# Patient Record
Sex: FEMALE | Race: WHITE | Employment: FULL TIME | URBAN - METROPOLITAN AREA
[De-identification: names, ages, dates, MRNs, and addresses within clinical notes are randomized per-mention and may not be internally consistent; named-entity substitution may affect disease eponyms.]

---

## 2018-07-24 PROBLEM — E66.3 OVERWEIGHT: Status: ACTIVE | Noted: 2018-07-24

## 2018-07-24 PROBLEM — R10.2 PELVIC PAIN IN FEMALE: Status: ACTIVE | Noted: 2018-04-20

## 2018-07-24 PROBLEM — F17.200 CURRENT SMOKER: Status: ACTIVE | Noted: 2018-07-24

## 2018-11-13 PROBLEM — R10.2 PELVIC PAIN IN FEMALE: Status: RESOLVED | Noted: 2018-04-20 | Resolved: 2018-11-13

## 2018-11-13 PROBLEM — E66.811 OBESITY, CLASS I, BMI 30-34.9: Status: ACTIVE | Noted: 2018-11-13

## 2018-11-13 PROBLEM — F17.200 CURRENT SMOKER: Status: RESOLVED | Noted: 2018-07-24 | Resolved: 2018-11-13

## 2018-11-13 PROBLEM — E66.3 OVERWEIGHT: Status: RESOLVED | Noted: 2018-07-24 | Resolved: 2018-11-13

## 2019-07-11 PROBLEM — R52 PAIN MANAGEMENT: Status: ACTIVE | Noted: 2019-07-11

## 2020-02-18 PROBLEM — R25.1 TREMOR: Status: ACTIVE | Noted: 2020-02-18

## 2020-06-19 PROBLEM — R94.39 ABNORMAL STRESS ECHO: Status: ACTIVE | Noted: 2020-06-19

## 2020-06-25 ENCOUNTER — TELEPHONE (OUTPATIENT)
Dept: FAMILY MEDICINE CLINIC | Age: 45
End: 2020-06-25

## 2020-10-06 PROBLEM — I20.8 MICROVASCULAR ANGINA (HCC): Status: ACTIVE | Noted: 2020-10-06

## 2020-10-06 PROBLEM — K21.9 LARYNGOPHARYNGEAL REFLUX (LPR): Status: ACTIVE | Noted: 2020-07-08

## 2020-10-06 PROBLEM — M15.0 PRIMARY OSTEOARTHRITIS INVOLVING MULTIPLE JOINTS: Status: ACTIVE | Noted: 2020-10-06

## 2020-10-06 PROBLEM — R09.A2 GLOBUS SENSATION: Status: ACTIVE | Noted: 2020-07-08

## 2020-10-06 PROBLEM — I20.89 MICROVASCULAR ANGINA: Status: ACTIVE | Noted: 2020-10-06

## 2020-10-06 PROBLEM — L82.1 SEBORRHEIC KERATOSIS: Status: ACTIVE | Noted: 2020-10-06

## 2020-10-06 PROBLEM — R94.39 POSITIVE CARDIAC STRESS TEST: Status: ACTIVE | Noted: 2020-04-27

## 2021-06-15 PROBLEM — R53.82 CHRONIC FATIGUE: Status: ACTIVE | Noted: 2021-06-15

## 2021-06-15 PROBLEM — M79.10 MYALGIA: Status: ACTIVE | Noted: 2021-06-15

## 2021-06-15 PROBLEM — G25.0 ESSENTIAL TREMOR: Status: ACTIVE | Noted: 2021-06-15

## 2021-11-19 DIAGNOSIS — R06.09 DYSPNEA ON EXERTION: ICD-10-CM

## 2021-11-19 PROBLEM — I20.1 VASOSPASTIC ANGINA (HCC): Status: ACTIVE | Noted: 2020-10-06

## 2022-02-22 PROBLEM — I99.8 ISCHEMIA: Status: ACTIVE | Noted: 2022-02-22

## 2022-02-22 PROBLEM — I25.10 ENDOTHELIAL DYSFUNCTION OF CORONARY ARTERY: Status: ACTIVE | Noted: 2022-02-22

## 2022-05-04 PROBLEM — I25.89 CARDIAC MICROVASCULAR DISEASE: Status: ACTIVE | Noted: 2022-02-10

## 2022-05-04 PROBLEM — I25.85 CARDIAC MICROVASCULAR DISEASE: Status: ACTIVE | Noted: 2022-02-10

## 2022-05-04 PROBLEM — R07.9 CHEST PAIN: Status: ACTIVE | Noted: 2020-04-27

## 2023-01-31 ENCOUNTER — OFFICE VISIT (OUTPATIENT)
Dept: FAMILY MEDICINE CLINIC | Age: 48
End: 2023-01-31
Payer: COMMERCIAL

## 2023-01-31 VITALS
SYSTOLIC BLOOD PRESSURE: 110 MMHG | HEART RATE: 90 BPM | OXYGEN SATURATION: 97 % | HEIGHT: 66 IN | WEIGHT: 211 LBS | BODY MASS INDEX: 33.91 KG/M2 | DIASTOLIC BLOOD PRESSURE: 70 MMHG

## 2023-01-31 DIAGNOSIS — R05.3 CHRONIC COUGH: ICD-10-CM

## 2023-01-31 DIAGNOSIS — R59.9 SWOLLEN LYMPH NODES: ICD-10-CM

## 2023-01-31 DIAGNOSIS — Z09 ENCOUNTER FOR EXAMINATION FOLLOWING TREATMENT AT HOSPITAL: Primary | ICD-10-CM

## 2023-01-31 DIAGNOSIS — R53.82 CHRONIC FATIGUE AND MALAISE: ICD-10-CM

## 2023-01-31 DIAGNOSIS — R53.81 CHRONIC FATIGUE AND MALAISE: ICD-10-CM

## 2023-01-31 DIAGNOSIS — K21.9 GASTROESOPHAGEAL REFLUX DISEASE WITHOUT ESOPHAGITIS: ICD-10-CM

## 2023-01-31 DIAGNOSIS — R35.0 URINARY FREQUENCY: ICD-10-CM

## 2023-01-31 DIAGNOSIS — R60.9 FLUID RETENTION: ICD-10-CM

## 2023-01-31 LAB
BILIRUBIN, POC: NORMAL
BLOOD URINE, POC: NORMAL
CLARITY, POC: CLEAR
COLOR, POC: YELLOW
GLUCOSE URINE, POC: NORMAL
KETONES, POC: NORMAL
LEUKOCYTE EST, POC: NORMAL
NITRITE, POC: NORMAL
PH, POC: 5.5
PROTEIN, POC: NORMAL
SPECIFIC GRAVITY, POC: 1.02
UROBILINOGEN, POC: 0.2

## 2023-01-31 PROCEDURE — 81002 URINALYSIS NONAUTO W/O SCOPE: CPT | Performed by: NURSE PRACTITIONER

## 2023-01-31 PROCEDURE — 99215 OFFICE O/P EST HI 40 MIN: CPT | Performed by: NURSE PRACTITIONER

## 2023-01-31 RX ORDER — FAMOTIDINE 20 MG/1
20 TABLET, FILM COATED ORAL 2 TIMES DAILY
COMMUNITY
Start: 2023-01-23 | End: 2023-07-22

## 2023-01-31 RX ORDER — FUROSEMIDE 40 MG/1
40 TABLET ORAL DAILY
COMMUNITY
Start: 2023-01-24

## 2023-01-31 RX ORDER — SUCRALFATE ORAL 1 G/10ML
1 SUSPENSION ORAL EVERY 6 HOURS
COMMUNITY
Start: 2023-01-23 | End: 2023-02-22

## 2023-01-31 ASSESSMENT — ENCOUNTER SYMPTOMS
VOMITING: 0
NAUSEA: 0
TROUBLE SWALLOWING: 0
SORE THROAT: 0
PHOTOPHOBIA: 0
SINUS PAIN: 1
CHEST TIGHTNESS: 0
DIARRHEA: 0
FACIAL SWELLING: 0
BLOOD IN STOOL: 0
EYE DISCHARGE: 0
ABDOMINAL PAIN: 0
CHOKING: 0
ALLERGIC/IMMUNOLOGIC NEGATIVE: 1
CONSTIPATION: 0
WHEEZING: 0
GASTROINTESTINAL NEGATIVE: 1
EYE ITCHING: 0
RHINORRHEA: 0
SHORTNESS OF BREATH: 1
BACK PAIN: 0
SINUS PRESSURE: 1
EYE PAIN: 0
COUGH: 1
STRIDOR: 0
EYE REDNESS: 0
APNEA: 0
VOICE CHANGE: 0
COLOR CHANGE: 0

## 2023-01-31 ASSESSMENT — PATIENT HEALTH QUESTIONNAIRE - PHQ9
3. TROUBLE FALLING OR STAYING ASLEEP: 0
8. MOVING OR SPEAKING SO SLOWLY THAT OTHER PEOPLE COULD HAVE NOTICED. OR THE OPPOSITE, BEING SO FIGETY OR RESTLESS THAT YOU HAVE BEEN MOVING AROUND A LOT MORE THAN USUAL: 0
9. THOUGHTS THAT YOU WOULD BE BETTER OFF DEAD, OR OF HURTING YOURSELF: 0
10. IF YOU CHECKED OFF ANY PROBLEMS, HOW DIFFICULT HAVE THESE PROBLEMS MADE IT FOR YOU TO DO YOUR WORK, TAKE CARE OF THINGS AT HOME, OR GET ALONG WITH OTHER PEOPLE: 0
7. TROUBLE CONCENTRATING ON THINGS, SUCH AS READING THE NEWSPAPER OR WATCHING TELEVISION: 0
SUM OF ALL RESPONSES TO PHQ QUESTIONS 1-9: 0
2. FEELING DOWN, DEPRESSED OR HOPELESS: 0
SUM OF ALL RESPONSES TO PHQ QUESTIONS 1-9: 0
SUM OF ALL RESPONSES TO PHQ QUESTIONS 1-9: 0
4. FEELING TIRED OR HAVING LITTLE ENERGY: 0
6. FEELING BAD ABOUT YOURSELF - OR THAT YOU ARE A FAILURE OR HAVE LET YOURSELF OR YOUR FAMILY DOWN: 0
5. POOR APPETITE OR OVEREATING: 0
SUM OF ALL RESPONSES TO PHQ9 QUESTIONS 1 & 2: 0
SUM OF ALL RESPONSES TO PHQ QUESTIONS 1-9: 0
1. LITTLE INTEREST OR PLEASURE IN DOING THINGS: 0

## 2023-01-31 NOTE — PROGRESS NOTES
[facility] PHYSICIAN PRACTICES  [facility]  [address] [place] [address]  [place] [id number]  Dept: [phone]  Dept Fax: [phone]  Loc: [phone]    Duane Davis is a 53 y.o. female who presents today for her medical conditions/complaints as noted below. Duane Davis is c/o of Follow-up (Pt is here to follow up on cough and swelling.)        HPI:     Chief Complaint   Patient presents with    Follow-up     Pt is here to follow up on cough and swelling. HPI    Nely Alegria presents to the office today to follow up for when she went to the ER on 01/23/2023 for a chronic cough. She went to the ER as her cardiologist sent her as she was chest pain associated with the cough and shortness of breath. She sees Dr. Gage Rico with cardiology at [address] [place] [address] for cardiac microvascular disease. She was sent to the ER as she was having sinus pain and fullness, sinus drainage, and cough for about 6 weeks. Her symptoms were thought to be secondary to GERD. She was started on Pepcid 20 mg daily and Carafate by the ER. Today, Nely Alegria admits to being concerned she had an autoimmune disorder and heart failure. She is not thinking her chronic cough is from GERD. She states she is did not  her Carafate and Pepcid because she could not afford the medication. She states she feels like she is retaining fluid all over for the last 6 weeks. She has not taken her Furosemide as she states \"this is a new feeling I never had before and I was told I did not have to take my furosemide unless I needed to. \"  She admits that she getting low-grade (99.6 or 99.7) at times with body aches in the evening over the last six weeks. She states she has chronic sinus pain and pressure, but states her \"body does not like antibiotics. \" She admits that she has noticed chronic intermittent swollen glands in her right neck and left arm. She states she has had the swollen glands for over a year now.   She states she feels like she is constantly urinating and wakes up to urinate about every 2 hours. She states she constantly feels thirsty. She states that her joints hurt all over especially her right knee. She states she does not have any heartburn. She states she recently saw her cardiologist the day after going to the ER where she states that the cardiologist informed her that she may have \"heart failure\" and her echocardiogram was moved up to 2023. She states she has not seen a rheumatologist now for several years. She stopped going to her previous rheumatologist as she states that she was not getting anywhere with the rheumatologist. She admits to having chronic orthopnea.      Emergency room record, imaging and labs reviewed on 2023    Past Medical History:   Diagnosis Date    Fibromyalgia     H/O total hysterectomy 2018    unilat oop salpingectomy    History of nicotine use     quit     IBS (irritable bowel syndrome)     Insomnia     Iron malabsorption 2019    Pain management 2019    Pelvic floor dysfunction       Past Surgical History:   Procedure Laterality Date    CHOLECYSTECTOMY      COLON SURGERY      HYSTERECTOMY, TOTAL ABDOMINAL (CERVIX REMOVED)      unilat salpingectomy oop    NASAL SEPTUM SURGERY  13    OVARY SURGERY         Family History   Problem Relation Age of Onset    Heart Disease Maternal Grandmother     Arthritis Mother     Osteoporosis Mother     Osteoarthritis Mother        Social History     Tobacco Use    Smoking status: Former     Packs/day: 0.25     Years: 20.00     Pack years: 5.00     Types: Cigarettes     Quit date: 2003     Years since quittin.9    Smokeless tobacco: Never   Substance Use Topics    Alcohol use: No     Comment: 2 monthly      Current Outpatient Medications   Medication Sig Dispense Refill    famotidine (PEPCID) 20 MG tablet Take 20 mg by mouth 2 times daily      furosemide (LASIX) 40 MG tablet Take 40 mg by mouth daily      sucralfate (CARAFATE) 1 GM/10ML suspension Take 1 g by mouth in the morning and 1 g at noon and 1 g in the evening and 1 g before bedtime. amitriptyline (ELAVIL) 25 MG tablet TAKE 1 TO 2 TABLETS BY MOUTH EVERY NIGHT AT BEDTIME      atorvastatin (LIPITOR) 80 MG tablet Take 80 mg by mouth daily      DULoxetine (CYMBALTA) 60 MG extended release capsule Take 1 capsule by mouth daily 30 capsule 5    metoprolol succinate (TOPROL XL) 50 MG extended release tablet Take 50 mg by mouth daily      nitroGLYCERIN (NITRODUR) 0.4 MG/HR       ondansetron (ZOFRAN-ODT) 4 MG disintegrating tablet Take 4 mg by mouth every 12 hours as needed      amLODIPine (NORVASC) 2.5 MG tablet       ranolazine (RANEXA) 500 MG extended release tablet Take 500 mg by mouth 2 times daily      nitroGLYCERIN (NITROSTAT) 0.4 MG SL tablet PLACE 1 TABLET UNDER THE TONGUE EVERY 5 MINUTES AS NEEDED FOR CHEST PAIN      HYDROcodone-acetaminophen (NORCO)  MG per tablet Every other day      aspirin 81 MG tablet Take 81 mg by mouth daily      Prenatal Vit-Fe Fumarate-FA (PRENATAL PO) Take by mouth      cyclobenzaprine (FLEXERIL) 10 MG tablet Take 1 tablet by mouth 3 times daily as needed  2    diclofenac (VOLTAREN) 75 MG EC tablet Take 1 tablet by mouth 2 times daily as needed      Probiotic Product (PROBIOTIC DAILY PO) Take  by mouth. No current facility-administered medications for this visit. Allergies   Allergen Reactions    Kiwi Extract Anaphylaxis     Eyes, and throat swell    Pcn [Penicillins]     Doxycycline Nausea And Vomiting       :      Review of Systems   Constitutional:  Positive for chills, fatigue (Chronic) and fever (Over last 6 weeks). Negative for activity change, appetite change, diaphoresis and unexpected weight change. HENT:  Positive for congestion (inus), sinus pressure (Chronic) and sinus pain (Chronic).  Negative for dental problem, drooling, ear discharge, ear pain, facial swelling, hearing loss, mouth sores, nosebleeds, postnasal drip, rhinorrhea, sneezing, sore throat, tinnitus, trouble swallowing and voice change. Eyes:  Negative for photophobia, pain, discharge, redness, itching and visual disturbance. Respiratory:  Positive for cough (Dry, hacking) and shortness of breath (When coughing). Negative for apnea, choking, chest tightness, wheezing and stridor. Cardiovascular:  Positive for chest pain (When coughing) and leg swelling. Negative for palpitations. Gastrointestinal: Negative. Negative for abdominal pain, blood in stool, constipation, diarrhea, nausea and vomiting. Genitourinary:  Positive for frequency and urgency. Negative for decreased urine volume, difficulty urinating, dyspareunia, dysuria, enuresis, flank pain, genital sores, hematuria, menstrual problem, pelvic pain, vaginal bleeding, vaginal discharge and vaginal pain. Musculoskeletal:  Positive for arthralgias, joint swelling and myalgias. Negative for back pain, gait problem, neck pain and neck stiffness. Skin: Negative. Negative for color change, pallor, rash and wound. Allergic/Immunologic: Negative. Neurological:  Positive for weakness (Generalized fatigue and malaise). Negative for dizziness, facial asymmetry, light-headedness and headaches. Psychiatric/Behavioral:  Negative for agitation, behavioral problems, confusion, decreased concentration, dysphoric mood, hallucinations, self-injury, sleep disturbance and suicidal ideas. The patient is not nervous/anxious and is not hyperactive.         Objective:     Vitals:    01/31/23 1435   BP: 110/70   Site: Right Upper Arm   Position: Sitting   Cuff Size: Large Adult   Pulse: 90   SpO2: 97%   Weight: 211 lb (95.7 kg)   Height: 5' 6\" (1.676 m)     Wt Readings from Last 3 Encounters:   01/31/23 211 lb (95.7 kg)   05/04/22 200 lb (90.7 kg)   08/23/21 199 lb (90.3 kg)     Temp Readings from Last 3 Encounters:   01/23/21 98.4 °F (36.9 °C) (Oral)   09/23/20 98.2 °F (36.8 °C) (Temporal)   03/26/20 98.5 °F (36.9 °C) (Oral)     BP Readings from Last 3 Encounters:   01/31/23 110/70   05/04/22 118/62   08/23/21 122/86     Pulse Readings from Last 3 Encounters:   01/31/23 90   05/04/22 72   08/23/21 90     Physical Exam  Vitals and nursing note reviewed. Constitutional:       General: She is not in acute distress. Appearance: Normal appearance. She is well-developed. She is obese. She is not diaphoretic. HENT:      Head: Normocephalic and atraumatic. Right Ear: Tympanic membrane, ear canal and external ear normal. There is no impacted cerumen. Left Ear: Tympanic membrane, ear canal and external ear normal. There is no impacted cerumen. Nose: Nose normal. No congestion or rhinorrhea. Mouth/Throat:      Mouth: Mucous membranes are moist.      Pharynx: Oropharynx is clear. No oropharyngeal exudate or posterior oropharyngeal erythema. Eyes:      General: No scleral icterus. Right eye: No discharge. Left eye: No discharge. Extraocular Movements: Extraocular movements intact. Conjunctiva/sclera: Conjunctivae normal.      Pupils: Pupils are equal, round, and reactive to light. Neck:      Vascular: No carotid bruit. Trachea: No tracheal deviation. Cardiovascular:      Rate and Rhythm: Normal rate and regular rhythm. Pulses: Normal pulses. Heart sounds: Normal heart sounds. No murmur heard. No friction rub. No gallop. Pulmonary:      Effort: Pulmonary effort is normal. No respiratory distress. Breath sounds: Normal breath sounds. No stridor. No wheezing, rhonchi or rales. Chest:      Chest wall: No tenderness. Abdominal:      General: Bowel sounds are normal. There is no distension. Palpations: Abdomen is soft. There is no mass. Tenderness: There is no abdominal tenderness. There is no guarding or rebound. Hernia: No hernia is present. Musculoskeletal:         General: No swelling, tenderness, deformity or signs of injury.  Normal range of motion. Cervical back: Normal range of motion and neck supple. No rigidity. No muscular tenderness. Right lower leg: No edema. Left lower leg: No edema. Lymphadenopathy:      Cervical: No cervical adenopathy. Skin:     General: Skin is warm and dry. Capillary Refill: Capillary refill takes less than 2 seconds. Coloration: Skin is not jaundiced or pale. Findings: No bruising, erythema, lesion or rash. Neurological:      General: No focal deficit present. Mental Status: She is alert and oriented to person, place, and time. Mental status is at baseline. Cranial Nerves: No cranial nerve deficit. Sensory: No sensory deficit. Motor: No weakness or abnormal muscle tone. Coordination: Coordination normal.      Gait: Gait normal.      Deep Tendon Reflexes: Reflexes are normal and symmetric. Reflexes normal.   Psychiatric:         Mood and Affect: Mood normal.         Behavior: Behavior normal.         Thought Content:  Thought content normal.         Judgment: Judgment normal.       Admission on 01/23/2021, Discharged on 01/23/2021   Component Date Value Ref Range Status    WBC 01/23/2021 7.6  4.0 - 11.0 K/uL Final    RBC 01/23/2021 4.43  4.00 - 5.20 M/uL Final    Hemoglobin 01/23/2021 12.9  12.0 - 16.0 g/dL Final    Hematocrit 01/23/2021 38.4  36.0 - 48.0 % Final    MCV 01/23/2021 86.8  80.0 - 100.0 fL Final    MCH 01/23/2021 29.2  26.0 - 34.0 pg Final    MCHC 01/23/2021 33.6  31.0 - 36.0 g/dL Final    RDW 01/23/2021 13.9  12.4 - 15.4 % Final    Platelets 81/74/2199 402  135 - 450 K/uL Final    MPV 01/23/2021 7.6  5.0 - 10.5 fL Final    Neutrophils % 01/23/2021 57.3  % Final    Lymphocytes % 01/23/2021 31.5  % Final    Monocytes % 01/23/2021 8.5  % Final    Eosinophils % 01/23/2021 1.7  % Final    Basophils % 01/23/2021 1.0  % Final    Neutrophils Absolute 01/23/2021 4.4  1.7 - 7.7 K/uL Final    Lymphocytes Absolute 01/23/2021 2.4  1.0 - 5.1 K/uL Final Monocytes Absolute 01/23/2021 0.6  0.0 - 1.3 K/uL Final    Eosinophils Absolute 01/23/2021 0.1  0.0 - 0.6 K/uL Final    Basophils Absolute 01/23/2021 0.1  0.0 - 0.2 K/uL Final    Sodium 01/23/2021 142  136 - 145 mmol/L Final    Potassium 01/23/2021 3.8  3.5 - 5.1 mmol/L Final    Chloride 01/23/2021 107  99 - 110 mmol/L Final    CO2 01/23/2021 26  21 - 32 mmol/L Final    Anion Gap 01/23/2021 9  3 - 16 Final    Glucose 01/23/2021 110 (A)  70 - 99 mg/dL Final    BUN 01/23/2021 9  7 - 20 mg/dL Final    Creatinine 01/23/2021 0.6  0.6 - 1.1 mg/dL Final    GFR Non- 01/23/2021 >60  >60 Final    Comment: >60 mL/min/1.73m2 EGFR, calc. for ages 25 and older using the  MDRD formula (not corrected for weight), is valid for stable  renal function. GFR  01/23/2021 >60  >60 Final    Comment: Chronic Kidney Disease: less than 60 ml/min/1.73 sq.m. Kidney Failure: less than 15 ml/min/1.73 sq.m. Results valid for patients 18 years and older. Calcium 01/23/2021 9.0  8.3 - 10.6 mg/dL Final    Total Protein 01/23/2021 6.8  6.4 - 8.2 g/dL Final    Albumin 01/23/2021 4.3  3.4 - 5.0 g/dL Final    Albumin/Globulin Ratio 01/23/2021 1.7  1.1 - 2.2 Final    Total Bilirubin 01/23/2021 0.6  0.0 - 1.0 mg/dL Final    Alkaline Phosphatase 01/23/2021 76  40 - 129 U/L Final    ALT 01/23/2021 12  10 - 40 U/L Final    AST 01/23/2021 16  15 - 37 U/L Final    Globulin 01/23/2021 2.5  g/dL Final    Protime 01/23/2021 9.8 (A)  10.0 - 13.2 sec Final    Comment: Effective 11-19-19 09:00am EST  Please note reference ranges have  changed for PT and INR Testing. INR 01/23/2021 0.84 (A)  0.86 - 1.14 Final    Comment: Effective 11/19/19 at 09:00am EST    Normal: 0.86 - 1.14  Therapeutic: 2.0 - 3.0  Pros.  Valve: 2.5 - 3.5  AMI: 2.0 - 3.0      D-Dimer, Quant 01/23/2021 <200  0 - 229 ng/mL DDU Final    Comment: HemosIL D-Dimer is an automated latex enhanced immunoassay for  the quantitative determination of D-dimer in human citrated  plasma on IL Coagulation systems for use in conjunction with a  clinical pretest probability(PTP) assessment model to exclude  venous thromboembolism(VTE) in outpatients suspected of deep  venous thrombosis (DVT) and pulmonary embolism(PE). The  reference range for D-Dimer is <230 ng/ml DDU. Results <230 ng/ml  DDU can be used as a negative predictor in patients with low  and moderate probability of DVT/PE. D-Dimer levels are mainly  elevated in cases of DVT/PE.  Other conditions may lead to a  high D-Dimer level including old age, pregnancy, peripheral  arteriopathy, DIC, coronary disease, thrombolytic treatment,  cancer, liver disease, infection, inflammation, hematoma, and  rheumatoid arthritis.  Specimen interferences are created by  lipemia, bilirubin, and hemolysis.      Ventricular Rate 01/23/2021 93  BPM Final    Atrial Rate 01/23/2021 93  BPM Final    P-R Interval 01/23/2021 138  ms Final    QRS Duration 01/23/2021 90  ms Final    Q-T Interval 01/23/2021 366  ms Final    QTc Calculation (Bazett) 01/23/2021 455  ms Final    P Axis 01/23/2021 67  degrees Final    R Axis 01/23/2021 76  degrees Final    T Axis 01/23/2021 39  degrees Final    Diagnosis 01/23/2021 Sinus rhythm with occasional Premature ventricular complexesNonspecific ST and T wave abnormalityAbnormal ECGWhen compared with ECG of 26-FEB-2020 02:51,Premature ventricular complexes are now PresentNonspecific T wave abnormality now evident in Lateral leadsConfirmed by SHRUTI MARR MD ([id number]) on 1/23/2021 8:41:52 PM   Final           Assessment & Plan:     The following diagnoses and conditions are stable with no further orders unless indicated:  1. Encounter for examination following treatment at hospital    2. Chronic cough    3. Gastroesophageal reflux disease without esophagitis    4. Chronic fatigue and malaise    5. Fluid retention    6. Urinary frequency    7. Swollen lymph nodes        Jose Antonio was seen today for  follow-up. Recommend patient  the Pepcid and the Carafate for concern of GERD with her cough. She states that should be able to when she gets the money on Friday. She continues to believe that her cough is not secondary to GERD but more related to an autoimmune disorder and from possible heart failure. She did follow-up with cardiology recently and she states an echocardiogram was ordered to see about her fluid retention. She states she is been having low-grade fevers. We will recheck a CBC today. With her having increased urinary frequency, will check a urinalysis today as well. BNP ordered to evaluate her fluid retention that she feels like she is having. No edema noted on examination today. Lungs are clear. With her having generalized myalgias, fevers, fatigue, will update labs to check for possible autoimmune factor. She is not wanting to try any antibiotics for sinusitis despite her having bilateral sinus pain and pressure for several weeks now. Recommend her following up with her PCP in the next 2 weeks. Diagnoses and all orders for this visit:    Encounter for examination following treatment at hospital    Chronic cough  -     CBC with Auto Differential  -     Comprehensive Metabolic Panel    Gastroesophageal reflux disease without esophagitis    Chronic fatigue and malaise  -     Rheumatoid Factor  -     Sedimentation Rate  -     C-Reactive Protein Inflammatory  -     LENKA  -     Lyme Disease AB Total W Rflx to IgG/ IgM  -     TSH with Reflex    Fluid retention  -     Brain Natriuretic Peptide  -     Lyme Disease AB Total W Rflx to IgG/ IgM    Urinary frequency  -     POCT Urinalysis no Micro    Swollen lymph nodes  -     Lyme Disease AB Total W Rflx to IgG/ IgM    Prior to Visit Medications    Medication Sig Taking?  Authorizing Provider   famotidine (PEPCID) 20 MG tablet Take 20 mg by mouth 2 times daily Yes Historical Provider, MD   furosemide (LASIX) 40 MG tablet Take 40 mg by mouth daily Yes Historical Provider, MD   sucralfate (CARAFATE) 1 GM/10ML suspension Take 1 g by mouth in the morning and 1 g at noon and 1 g in the evening and 1 g before bedtime. Yes Historical Provider, MD   amitriptyline (ELAVIL) 25 MG tablet TAKE 1 TO 2 TABLETS BY MOUTH EVERY NIGHT AT BEDTIME Yes Historical Provider, MD   atorvastatin (LIPITOR) 80 MG tablet Take 80 mg by mouth daily Yes Historical Provider, MD   DULoxetine (CYMBALTA) 60 MG extended release capsule Take 1 capsule by mouth daily Yes Stephanie Mayo, APRN - CNP   metoprolol succinate (TOPROL XL) 50 MG extended release tablet Take 50 mg by mouth daily Yes Historical Provider, MD   nitroGLYCERIN (NITRODUR) 0.4 MG/HR  Yes Historical Provider, MD   ondansetron (ZOFRAN-ODT) 4 MG disintegrating tablet Take 4 mg by mouth every 12 hours as needed Yes Historical Provider, MD   amLODIPine (NORVASC) 2.5 MG tablet  Yes Historical Provider, MD   ranolazine (RANEXA) 500 MG extended release tablet Take 500 mg by mouth 2 times daily Yes Historical Provider, MD   nitroGLYCERIN (NITROSTAT) 0.4 MG SL tablet PLACE 1 TABLET UNDER THE TONGUE EVERY 5 MINUTES AS NEEDED FOR CHEST PAIN Yes Historical Provider, MD   HYDROcodone-acetaminophen (Jimbo Rands)  MG per tablet Every other day Yes Historical Provider, MD   aspirin 81 MG tablet Take 81 mg by mouth daily Yes Historical Provider, MD   Prenatal Vit-Fe Fumarate-FA (PRENATAL PO) Take by mouth Yes Historical Provider, MD   cyclobenzaprine (FLEXERIL) 10 MG tablet Take 1 tablet by mouth 3 times daily as needed Yes Historical Provider, MD   diclofenac (VOLTAREN) 75 MG EC tablet Take 1 tablet by mouth 2 times daily as needed Yes Historical Provider, MD   Probiotic Product (PROBIOTIC DAILY PO) Take  by mouth. Yes Historical Provider, MD     No orders of the defined types were placed in this encounter. Return in 2 weeks (on 2/14/2023), or if symptoms worsen or fail to improve, for F/U with PCP on cough .     Patient should call the office immediately with new or ongoing signs or symptoms or worsening, or proceedto the emergency room. No changes in past medical history, past surgical history, social history, or family history were noted during the patient encounter unless specifically listed above. All updates of past medicalhistory, past surgical history, social history, or family history were reviewed personally by me during the office visit. All problems listed in the assessment are stable unless noted otherwise. Medication profilereviewed personally by me during the office visit. Medication side effects and possible impairments from medications were discussed as applicable. Call if pattern of symptoms change or persists for an extended time. This document was prepared by a combination of typing and transcription through a voice recognition software. All medications have the potential for adverse effects. All medications effect each person differently. Please read and review provided information related to medication. If the medication that you have been prescribed has the potential to cause sedation, do not drive or operate car, truck, or heavy machinery until you know how the medication will effect you. If you experience any adverse effects from the medication, please call the office or report to the emergency department. A total of 52 minutes was spent reviewing previous progress notes, discussing HPI, ROS, treatment plan of care, patient education, and completion of progress note.

## 2023-02-01 DIAGNOSIS — R05.3 CHRONIC COUGH: Primary | ICD-10-CM

## 2023-02-01 DIAGNOSIS — D72.829 LEUKOCYTOSIS, UNSPECIFIED TYPE: Primary | ICD-10-CM

## 2023-02-01 LAB
A/G RATIO: 2 (ref 1.1–2.2)
ALBUMIN SERPL-MCNC: 4.5 G/DL (ref 3.4–5)
ALP BLD-CCNC: 75 U/L (ref 40–129)
ALT SERPL-CCNC: 15 U/L (ref 10–40)
ANION GAP SERPL CALCULATED.3IONS-SCNC: 18 MMOL/L (ref 3–16)
ANTI-NUCLEAR ANTIBODY (ANA): NEGATIVE
AST SERPL-CCNC: 17 U/L (ref 15–37)
BASOPHILS ABSOLUTE: 0.1 K/UL (ref 0–0.2)
BASOPHILS RELATIVE PERCENT: 1.1 %
BILIRUB SERPL-MCNC: 0.5 MG/DL (ref 0–1)
BUN BLDV-MCNC: 12 MG/DL (ref 7–20)
CALCIUM SERPL-MCNC: 9.9 MG/DL (ref 8.3–10.6)
CHLORIDE BLD-SCNC: 101 MMOL/L (ref 99–110)
CO2: 22 MMOL/L (ref 21–32)
CREAT SERPL-MCNC: 0.9 MG/DL (ref 0.6–1.1)
EOSINOPHILS ABSOLUTE: 0.1 K/UL (ref 0–0.6)
EOSINOPHILS RELATIVE PERCENT: 0.9 %
GFR SERPL CREATININE-BSD FRML MDRD: >60 ML/MIN/{1.73_M2}
GLUCOSE BLD-MCNC: 130 MG/DL (ref 70–99)
HCT VFR BLD CALC: 41.8 % (ref 36–48)
HEMOGLOBIN: 13.9 G/DL (ref 12–16)
LYMPHOCYTES ABSOLUTE: 2.5 K/UL (ref 1–5.1)
LYMPHOCYTES RELATIVE PERCENT: 17.7 %
MCH RBC QN AUTO: 29.5 PG (ref 26–34)
MCHC RBC AUTO-ENTMCNC: 33.2 G/DL (ref 31–36)
MCV RBC AUTO: 89 FL (ref 80–100)
MONOCYTES ABSOLUTE: 1.1 K/UL (ref 0–1.3)
MONOCYTES RELATIVE PERCENT: 7.8 %
NEUTROPHILS ABSOLUTE: 10.3 K/UL (ref 1.7–7.7)
NEUTROPHILS RELATIVE PERCENT: 72.5 %
PDW BLD-RTO: 13.7 % (ref 12.4–15.4)
PLATELET # BLD: 434 K/UL (ref 135–450)
PMV BLD AUTO: 8.5 FL (ref 5–10.5)
POTASSIUM SERPL-SCNC: 4.2 MMOL/L (ref 3.5–5.1)
PRO-BNP: 28 PG/ML (ref 0–124)
RBC # BLD: 4.7 M/UL (ref 4–5.2)
RHEUMATOID FACTOR: <10 IU/ML
SEDIMENTATION RATE, ERYTHROCYTE: 7 MM/HR (ref 0–20)
SODIUM BLD-SCNC: 141 MMOL/L (ref 136–145)
TOTAL PROTEIN: 6.7 G/DL (ref 6.4–8.2)
TSH REFLEX: 1.04 UIU/ML (ref 0.27–4.2)
WBC # BLD: 14.2 K/UL (ref 4–11)

## 2023-02-01 RX ORDER — AZITHROMYCIN 250 MG/1
250 TABLET, FILM COATED ORAL SEE ADMIN INSTRUCTIONS
Qty: 6 TABLET | Refills: 0 | Status: SHIPPED | OUTPATIENT
Start: 2023-02-01 | End: 2023-02-06

## 2023-02-02 LAB — LYME, EIA: 0.06 LIV (ref 0–1.2)

## 2023-02-20 ENCOUNTER — TELEMEDICINE (OUTPATIENT)
Dept: FAMILY MEDICINE CLINIC | Age: 48
End: 2023-02-20
Payer: COMMERCIAL

## 2023-02-20 DIAGNOSIS — B00.9 HERPES INFECTION: Primary | ICD-10-CM

## 2023-02-20 DIAGNOSIS — L50.8 CHRONIC URTICARIA: ICD-10-CM

## 2023-02-20 DIAGNOSIS — R59.9 SWOLLEN LYMPH NODES: ICD-10-CM

## 2023-02-20 PROCEDURE — 99213 OFFICE O/P EST LOW 20 MIN: CPT | Performed by: NURSE PRACTITIONER

## 2023-02-20 RX ORDER — VALACYCLOVIR HYDROCHLORIDE 1 G/1
1000 TABLET, FILM COATED ORAL 3 TIMES DAILY
Qty: 21 TABLET | Refills: 0 | Status: SHIPPED | OUTPATIENT
Start: 2023-02-20 | End: 2023-02-27

## 2023-02-20 RX ORDER — AMITRIPTYLINE HYDROCHLORIDE 50 MG/1
50 TABLET, FILM COATED ORAL DAILY
COMMUNITY
Start: 2023-02-09

## 2023-02-20 RX ORDER — RANOLAZINE 1000 MG/1
1000 TABLET, EXTENDED RELEASE ORAL 2 TIMES DAILY
COMMUNITY
Start: 2023-02-16

## 2023-02-20 SDOH — ECONOMIC STABILITY: FOOD INSECURITY: WITHIN THE PAST 12 MONTHS, YOU WORRIED THAT YOUR FOOD WOULD RUN OUT BEFORE YOU GOT MONEY TO BUY MORE.: NEVER TRUE

## 2023-02-20 SDOH — ECONOMIC STABILITY: INCOME INSECURITY: HOW HARD IS IT FOR YOU TO PAY FOR THE VERY BASICS LIKE FOOD, HOUSING, MEDICAL CARE, AND HEATING?: NOT HARD AT ALL

## 2023-02-20 SDOH — ECONOMIC STABILITY: FOOD INSECURITY: WITHIN THE PAST 12 MONTHS, THE FOOD YOU BOUGHT JUST DIDN'T LAST AND YOU DIDN'T HAVE MONEY TO GET MORE.: NEVER TRUE

## 2023-02-20 SDOH — ECONOMIC STABILITY: HOUSING INSECURITY
IN THE LAST 12 MONTHS, WAS THERE A TIME WHEN YOU DID NOT HAVE A STEADY PLACE TO SLEEP OR SLEPT IN A SHELTER (INCLUDING NOW)?: NO

## 2023-02-20 ASSESSMENT — ENCOUNTER SYMPTOMS
ABDOMINAL PAIN: 0
TROUBLE SWALLOWING: 0
PHOTOPHOBIA: 0
ANAL BLEEDING: 0
GASTROINTESTINAL NEGATIVE: 1
NAUSEA: 0
SHORTNESS OF BREATH: 0
EYE DISCHARGE: 1
EYE ITCHING: 1
ALLERGIC/IMMUNOLOGIC NEGATIVE: 1
EYE PAIN: 0
BLOOD IN STOOL: 0
RESPIRATORY NEGATIVE: 1
SORE THROAT: 0
EYE REDNESS: 0

## 2023-02-20 NOTE — PROGRESS NOTES
2023    TELEHEALTH EVALUATION -- Audio/Visual (During VSTLB-34 public health emergency)    HPI:    Heather Kapadia (:  1975) has requested an audio/video evaluation for the following concern(s):  Chief Complaint   Patient presents with    Other     Left eye is itchy and burning, swollen glands, rash pops up on different parts of the body and is itchy for the past 3 days. No fever      Patient states that she has a \"cold sore\" on her left eye. She states she does have a history of some sort of herpes that has been treated by ophthalmology in the past.  She does have an appointment with an ophthalmologist in 4 days but she does not want to wait to start treatment. She has used Valtrex in the past for this. She states that this time it is only her left eye. She states that she started with a rash around her left eye orbital region a few days ago and immediately followed with itching and burning. She then started yesterday with having a left eye foreign body sensation, watering of the eye and itching. Denies any visual changes. She denies any actual ocular pain. She denies discharge other than the watery discharge from the eye. She does state that her left orbital region is swollen slightly    Also the patient has a longstanding history of having a \"rash\" pop up intermittently all over her body. She also states when this \"rash\" pops up intermittently she also will have lymph nodes in her neck become very tender and swollen. She states that the symptoms will last a few hours to 24 hours and then the next day her symptoms will be completely resolved. This is been ongoing for approximately 2 to 3 years.       Last week she reports that she had 1 day where her Tongue swelled up and she started having a rash on her neck that was followed by a rash on her chest and then all over her body   She states that the glands in her neck again swelled up and were very tender to the touch for approximately 1 day and then her symptoms resolved. She states that since last week this has been happening multiple times. She has seen rheumatology because apparently at some point she was told she had some sort of autoimmune issue that was causing this rash and swollen lymph nodes. He is never seen allergy or immunology      Review of Systems   Constitutional: Negative. Negative for appetite change, chills, fatigue, fever and unexpected weight change. HENT: Negative. Negative for sore throat and trouble swallowing. Swollen and tender cervical lymph nodes   Eyes:  Positive for discharge (watery) and itching. Negative for photophobia, pain, redness and visual disturbance. Respiratory: Negative. Negative for shortness of breath. Cardiovascular: Negative. Negative for chest pain, palpitations and leg swelling. Gastrointestinal: Negative. Negative for abdominal pain, anal bleeding, blood in stool and nausea. Endocrine: Negative. Genitourinary: Negative. Negative for hematuria. Musculoskeletal:  Positive for myalgias. Skin:  Positive for rash. Allergic/Immunologic: Negative. Neurological: Negative. Negative for dizziness, syncope, light-headedness and numbness. Hematological: Negative. Does not bruise/bleed easily. Psychiatric/Behavioral: Negative. All other systems reviewed and are negative. Prior to Visit Medications    Medication Sig Taking? Authorizing Provider   famotidine (PEPCID) 20 MG tablet Take 20 mg by mouth 2 times daily Yes Historical Provider, MD   furosemide (LASIX) 40 MG tablet Take 40 mg by mouth daily Yes Historical Provider, MD   sucralfate (CARAFATE) 1 GM/10ML suspension Take 1 g by mouth in the morning and 1 g at noon and 1 g in the evening and 1 g before bedtime.  Yes Historical Provider, MD   amitriptyline (ELAVIL) 25 MG tablet TAKE 1 TO 2 TABLETS BY MOUTH EVERY NIGHT AT BEDTIME Yes Historical Provider, MD   atorvastatin (LIPITOR) 80 MG tablet Take 80 mg by mouth daily Yes Historical Provider, MD   DULoxetine (CYMBALTA) 60 MG extended release capsule Take 1 capsule by mouth daily Yes NAZARIO Arellano - CNP   metoprolol succinate (TOPROL XL) 50 MG extended release tablet Take 50 mg by mouth daily Yes Historical Provider, MD   nitroGLYCERIN (NITRODUR) 0.4 MG/HR  Yes Historical Provider, MD   ondansetron (ZOFRAN-ODT) 4 MG disintegrating tablet Take 4 mg by mouth every 12 hours as needed Yes Historical Provider, MD   amLODIPine (NORVASC) 2.5 MG tablet  Yes Historical Provider, MD   ranolazine (RANEXA) 500 MG extended release tablet Take 500 mg by mouth 2 times daily Yes Historical Provider, MD   nitroGLYCERIN (NITROSTAT) 0.4 MG SL tablet PLACE 1 TABLET UNDER THE TONGUE EVERY 5 MINUTES AS NEEDED FOR CHEST PAIN Yes Historical Provider, MD   HYDROcodone-acetaminophen (Arcadio Newhall)  MG per tablet Every other day Yes Historical Provider, MD   aspirin 81 MG tablet Take 81 mg by mouth daily Yes Historical Provider, MD   Prenatal Vit-Fe Fumarate-FA (PRENATAL PO) Take by mouth Yes Historical Provider, MD   cyclobenzaprine (FLEXERIL) 10 MG tablet Take 1 tablet by mouth 3 times daily as needed Yes Historical Provider, MD   diclofenac (VOLTAREN) 75 MG EC tablet Take 1 tablet by mouth 2 times daily as needed Yes Historical Provider, MD   Probiotic Product (PROBIOTIC DAILY PO) Take  by mouth.  Yes Historical Provider, MD       Allergies   Allergen Reactions    Kiwi Extract Anaphylaxis     Eyes, and throat swell    Pcn [Penicillins]     Doxycycline Nausea And Vomiting       PHYSICAL EXAMINATION:  [ INSTRUCTIONS:  \"[x]\" Indicates a positive item  \"[]\" Indicates a negative item  -- DELETE ALL ITEMS NOT EXAMINED]  Vital Signs: (As obtained by patient/caregiver or practitioner observation)     Blood pressure-          Heart rate-         Respiratory rate-         Temperature-            Pulse oximetry-      Constitutional: [x] Appears well-developed and well-nourished [x] No apparent distress                            [] Abnormal-   Mental status  [x] Alert and awake  [x] Oriented to person/place/time [x]Able to follow commands       Eyes:  EOM    [x]  Normal  [] Abnormal-  Sclera  [x]  Normal  [] Abnormal -         Discharge [x]  None visible  [] Abnormal -     HENT:   [x] Normocephalic, atraumatic. [] Abnormal   [x] Mouth/Throat: Mucous membranes are moist.      External Ears [x] Normal  [] Abnormal-      Neck: [x] No visualized mass      Pulmonary/Chest: [x] Respiratory effort normal.  [x] No visualized signs of difficulty breathing or respiratory distress        [] Abnormal-      Musculoskeletal:   [x] Normal gait with no signs of ataxia         [x] Normal range of motion of neck        [] Abnormal-         Neurological:        [x] No Facial Asymmetry (Cranial nerve 7 motor function) (limited exam to video visit)                       [x] No gaze palsy        [] Abnormal-         Skin:                     [x] No significant exanthematous lesions or discoloration noted on facial skin         [] Abnormal-                                  Psychiatric:           [x] Normal Affect [x] No Hallucinations        [] Abnormal     Other pertinent observable physical exam findings-      ASSESSMENT/PLAN:  1. Herpes infection  Start   - valACYclovir (VALTREX) 1 g tablet; Take 1 tablet by mouth 3 times daily for 7 days  Dispense: 21 tablet; Refill: 0  Follow-up with ophthalmology this week as scheduled    2. Swollen lymph nodes  3. Chronic urticaria  I educated the patient that the swollen lymph nodes may be related to the herpes of the eye that she is experiencing  The patient has a longstanding history of having a \"rash\" pop up intermittently all over her body. She also states when this \"rash\" pops up intermittently she also will have lymph nodes in her neck become very tender and swollen.   She states that the symptoms will last a few hours to 24 hours and then the next day her symptoms will be completely resolved. This is been ongoing for approximately 2 to 3 years. She has seen rheumatology because apparently at some point she was told she had some sort of autoimmune issue that was causing this rash and swollen lymph nodes. He is never seen allergy or immunology  Referral provided  - FRANCK Valverde MD, Allergy & Immunology, [facility]    Patient has been instructed call the office immediately with new symptoms, change in symptoms or worseningof symptoms. If this is not feasible, patient is instructed to report to the emergency room. Medication profile reviewed. Medication side effects and possible impairments from medications were discussed as applicable. Allergies were reviewed. Health maintenance was reviewed and updated as appropriate. No follow-ups on file. Devora Torres, was evaluated through a synchronous (real-time) audio-video encounter. The patient (or guardian if applicable) is aware that this is a billable service, which includes applicable co-pays. This Virtual Visit was conducted with patient's (and/or legal guardian's) consent. The visit was conducted pursuant to the emergency declaration under the [address] [place] [address],  waiver authority and the [employer] and [employer] General Act. Patient identification was verified, and a caregiver was present when appropriate. The patient was located at Home: [place] [id number]  Provider was located at [facility] (Appt Dept): [address],  [facility]        Total time spent on this encounter:  31 minutes    --NAZARIO Kincaid CNP on 2/20/2023 at 10:37 AM    An electronic signature was used to authenticate this note.

## 2023-03-01 ENCOUNTER — OFFICE VISIT (OUTPATIENT)
Dept: RHEUMATOLOGY | Age: 48
End: 2023-03-01
Payer: COMMERCIAL

## 2023-03-01 VITALS
SYSTOLIC BLOOD PRESSURE: 118 MMHG | OXYGEN SATURATION: 95 % | DIASTOLIC BLOOD PRESSURE: 80 MMHG | BODY MASS INDEX: 33.38 KG/M2 | HEART RATE: 105 BPM | WEIGHT: 206.8 LBS

## 2023-03-01 DIAGNOSIS — M35.00 SICCA SYNDROME (HCC): ICD-10-CM

## 2023-03-01 DIAGNOSIS — M15.9 PRIMARY OSTEOARTHRITIS INVOLVING MULTIPLE JOINTS: ICD-10-CM

## 2023-03-01 DIAGNOSIS — M79.7 FIBROMYALGIA: Primary | ICD-10-CM

## 2023-03-01 DIAGNOSIS — M79.7 FIBROMYALGIA: ICD-10-CM

## 2023-03-01 LAB
C-REACTIVE PROTEIN: <3 MG/L (ref 0–5.1)
SEDIMENTATION RATE, ERYTHROCYTE: 8 MM/HR (ref 0–20)
TOTAL CK: 70 U/L (ref 26–192)

## 2023-03-01 PROCEDURE — 99214 OFFICE O/P EST MOD 30 MIN: CPT | Performed by: INTERNAL MEDICINE

## 2023-03-01 NOTE — PROGRESS NOTES
3/6/2023   Patient Name: Lida Gallegos  : 1975  Medical Record: [id number]    MEDICATIONS  Current Outpatient Medications   Medication Sig Dispense Refill    amitriptyline (ELAVIL) 50 MG tablet Take 50 mg by mouth daily      ranolazine (RANEXA) 1000 MG extended release tablet Take 1,000 mg by mouth in the morning and at bedtime      famotidine (PEPCID) 20 MG tablet Take 20 mg by mouth 2 times daily      furosemide (LASIX) 40 MG tablet Take 40 mg by mouth daily      atorvastatin (LIPITOR) 80 MG tablet Take 80 mg by mouth daily      DULoxetine (CYMBALTA) 60 MG extended release capsule Take 1 capsule by mouth daily 30 capsule 5    metoprolol succinate (TOPROL XL) 50 MG extended release tablet Take 50 mg by mouth daily      nitroGLYCERIN (NITRODUR) 0.4 MG/HR       ondansetron (ZOFRAN-ODT) 4 MG disintegrating tablet Take 4 mg by mouth every 12 hours as needed      amLODIPine (NORVASC) 2.5 MG tablet       nitroGLYCERIN (NITROSTAT) 0.4 MG SL tablet PLACE 1 TABLET UNDER THE TONGUE EVERY 5 MINUTES AS NEEDED FOR CHEST PAIN      HYDROcodone-acetaminophen (NORCO)  MG per tablet Every other day      aspirin 81 MG tablet Take 81 mg by mouth daily      Prenatal Vit-Fe Fumarate-FA (PRENATAL PO) Take by mouth      cyclobenzaprine (FLEXERIL) 10 MG tablet Take 1 tablet by mouth 3 times daily as needed  2    diclofenac (VOLTAREN) 75 MG EC tablet Take 1 tablet by mouth 2 times daily as needed      Probiotic Product (PROBIOTIC DAILY PO) Take  by mouth.      sucralfate (CARAFATE) 1 GM/10ML suspension Take 1 g by mouth in the morning and 1 g at noon and 1 g in the evening and 1 g before bedtime. No current facility-administered medications for this visit. ALLERGIES  Allergies   Allergen Reactions    Kiwi Extract Anaphylaxis     Eyes, and throat swell    Pcn [Penicillins]     Doxycycline Nausea And Vomiting         Comments  No specialty comments available.     Background history: Laly FridayDEANA Certain Lowell Sanchez is a 53 y.o. female who is being seen for follow up evaluation of muscle and joint pain. She is complaining of widespread musculoskeletal pain involving upper and lower body on both sides of the midline. Symptoms started several years ago and are progressively getting worse. She has been on daily basis. Some days are better than others. Pain travels from one muscle to another. Symptoms are worse with activity/cold and rain without any significant relieving factors. She wakes up frequently at night due to pain. Sleep is not refreshing. She also has brain fog and memory changes. She also has chronic fatigue. She has anxiety. She has a chronic abdominal pain and pelvic pain. She has a history of multiple adhesions in the abdomen. She also has a history of hysterectomy with salpingo-oophorectomy due to adenomyosis. She takes Elavil 50 mg at night which helps with sleep. She also takes Flexeril as needed. She sees a pain specialist and is on hydrocodone. She is also complaining of pain in multiple joints. Symptoms are worse in her hips. She has intermittent swelling in the knees and right wrist.  She has stiffness in the morning lasting for 15 minutes. She she takes diclofenac 75 mg twice a day with improvement in pain She was recently evaluated by neurology for dizziness, tremors, loss of consciousness, headache, vision changes, low-grade fever ranging from 99.5-100. She had MRI of the brain which was normal.  She was placed on primidone for tremors. She denies psoriasis, inflammatory bowel disease, inflammatory back pain, dactylitis, enthesitis, tenosynovitis and uveitis. She has chronic dry eyes and dry mouth, sores in nose and eyes, Raynaud's and dysphagia. She denies malar rash, photosensitivity, oral ulcers, history of miscarriages, pregnancy complications, blood clots, pleurisy or pericarditis, kidney diseases, sclerodactyly, alopecia.   She denies weight loss or night sweats. Interim history: She presents for follow-up of fibromyalgia and osteo-arthritis. She was last seen in May 2021. She is no longer taking gabapentin. It did not help her much and she could not function due to cognitive impairment. She takes amitriptyline 50 mg at night as needed, diclofenac 75 mg once or twice a day as needed, Flexeril 10 mg as needed and Cymbalta 60 mg daily. She continues to have pain in the joints and muscles. She also has fatigue, memory changes, brain fog. She has difficulty falling and staying asleep. She has Raynaud's, dry eyes and dry mouth, intermittent nasal ulcers. Dry eyes and dry mouth has recently gotten worse. She was previously tested for connective tissue disease which came back negative. LENKA negative. CK, B12, vitamin D and TSH normal.  HPI  Review of Systems    REVIEW OF SYSTEMS: Positive for chronic fatigue, low-grade fevers, Raynaud's, nasal ulcers, dry eyes and dry mouth, vision changes, irritable bowel syndrome, headaches, anxiety  Constitutional: No unanticipated weight loss  Integumentary: No rash, photosensitivity, malar rash, livedo reticularis, alopecia, sclerodactyly, skin tightening  Eyes: negative for persistent redness, discharge from eyes   ENT: - No tinnitus, loss of hearing, vertigo, or recurrent ear infections.  - No history of nasal Ulcers. Cardiovascular: No history of pericarditis, chest pain or murmur or palpitations  Respiratory: No shortness of breath, cough or history of interstitial lung disease. No history of pleurisy. No history of tuberculosis or atypical infections. Gastrointestinal: No history of heart burn, esophageal dysmotility. No inflammatory bowel disease. Genitourinary: No history renal disease, miscarriages. Hematologic/Lymphatic: No abnormal bruising or bleeding, blood clots or swollen lymph nodes. Neurological: No history of headaches, seizure or focal weakness.  No history of neuropathies, paresthesias or hyperesthesias, facial droop, diplopia  Psychiatric: No history of bipolar disease  Endocrine: Denies any polyuria, polydipsia and osteoporosis  Allergic/Immunologic: No nasal congestion or hives. I have reviewed patients Past medical History, Social History and Family History as mentioned in her chart and this remains unchanged fromprevious.     Past Medical History:   Diagnosis Date    Fibromyalgia     H/O total hysterectomy 2018    unilat oop salpingectomy    History of nicotine use     quit     IBS (irritable bowel syndrome)     Insomnia     Iron malabsorption 2019    Pain management 2019    Pelvic floor dysfunction      Past Surgical History:   Procedure Laterality Date    CHOLECYSTECTOMY      COLON SURGERY      HYSTERECTOMY, TOTAL ABDOMINAL (CERVIX REMOVED)      unilat salpingectomy oop    NASAL SEPTUM SURGERY  13    OVARY SURGERY       Social History     Socioeconomic History    Marital status:      Spouse name: Not on file    Number of children: Not on file    Years of education: Not on file    Highest education level: Not on file   Occupational History    Not on file   Tobacco Use    Smoking status: Former     Packs/day: 0.25     Years: 20.00     Pack years: 5.00     Types: Cigarettes     Quit date: 2003     Years since quittin.0    Smokeless tobacco: Never   Vaping Use    Vaping Use: Every day    Substances: Nicotine    Devices: Refillable tank   Substance and Sexual Activity    Alcohol use: No     Comment: 2 monthly    Drug use: No    Sexual activity: Yes     Partners: Male   Other Topics Concern    Not on file   Social History Narrative    Not on file     Social Determinants of Health     Financial Resource Strain: Low Risk     Difficulty of Paying Living Expenses: Not hard at all   Food Insecurity: No Food Insecurity    Worried About Running Out of Food in the Last Year: Never true    Ran Out of Food in the Last Year: Never true   Transportation Needs: Unknown    Lack of Transportation (Medical): Not on file    Lack of Transportation (Non-Medical):  No   Physical Activity: Not on file   Stress: Not on file   Social Connections: Not on file   Intimate Partner Violence: Not on file   Housing Stability: Unknown    Unable to Pay for Housing in the Last Year: Not on file    Number of Places Lived in the Last Year: Not on file    Unstable Housing in the Last Year: No     Family History   Problem Relation Age of Onset    Heart Disease Maternal Grandmother     Arthritis Mother     Osteoporosis Mother     Osteoarthritis Mother        PHYSICAL EXAMINATION:    Vitals:    03/01/23 1450   BP: 118/80   Site: Right Upper Arm   Position: Sitting   Cuff Size: Large Adult   Pulse: (!) 105   SpO2: 95%   Weight: 206 lb 12.8 oz (93.8 kg)        Physical Exam  Constitutional:  Well developed, well nourished, no acute distress, non-toxic appearance   Musculoskeletal:    RIGHT  Swell  Tender  ROM  LEFT  Swell  Tender  ROM    DIP2  0  0   [place]  0  0   [place]   DIP3  0  0   [place]  0  0   [place]   DIP4  0  0  FULL   0  0  FULL    DIP5  0  0   [place]  0  0   [place]   PIP1  0  0  FULL   0  0  FULL    PIP2  0  0  FULL   0  0  FULL    PIP3  0  0  FULL   0  0  FULL    PIP4  0  0  FULL   0  0  FULL    PIP5  0  0  FULL   0  0  FULL    MCP1  0  0  FULL   0  0  FULL    MCP2  0  0  FULL   0  0  FULL    MCP3  0  0  FULL   0  0  FULL    MCP4  0  0  FULL   0  0  FULL    MCP5  0  0  FULL   0  0  FULL    Wrist  0  0  FULL   0  0  FULL    Elbow  0  0  FULL   0  0  FULL    Shouldr  0  0  FULL   0  0  FULL    Hip  0  0  DECR  0  0  DECR   Knee  0  0  FULL   0  0  FULL    Ankle  0  0  FULL   0  0  FULL    MTP1  0  0  FULL   0  0  FULL    MTP2  0  0  FULL   0  0  FULL    MTP3  0  0  FULL   0  0  FULL    MTP4  0  0  FULL   0  0  FULL    MTP5  0  0  FULL   0  0  FULL    IP1  0  0  FULL   0  0  FULL    IP2  0  0  FULL   0  0  FULL    IP3  0  0  FULL   0  0  FULL    IP4  0  0  FULL   0  0  FULL IP5  0  0  FULL   0 0 FULL                                             Right            Left                                   Tender Tender    Costochondral  + +   Low cervical + +   suboccipital + +   Trapezius  + +   Supraspinatus  + +   Lateral epicondyl + +   Gluteal + +   Greater trochanter  + +   knees + +     10 degree internal and external rotation in bilateral hips  Ambulates without assistance, normal gait  Neck: Full ROM, no tenderness,supple   Back- no tenderness. Eyes:  PERRL, extra ocular movements intact, conjunctiva normal   HEENT:  Atraumatic, normocephalic, external ears normal, oropharynx moist, no pharyngeal exudates. No lymphadenopathy or parotid gland enlargement  Respiratory:  No respiratory distress  GI:  Soft, nondistended, normal bowel sounds, nontender, noorganomegaly, no mass, no rebound, no guarding   :  No costovertebral angle tenderness   Integument:  Well hydrated, no rash or telangiectasias  Lymphatic:  No lymphadenopathy noted   Neurologic:   Alert & oriented x 3, CN 2-12 normal, no focal deficits noted. Sensations Intact. Muscle strength 5/5 proximallyand distally in upper and lower extremities.    Psychiatric:  Speech and behavior appropriate             LABS AND IMAGING  Outside data reviewed and in HPI    Lab Results   Component Value Date/Time    WBC 10.1 02/08/2023 07:53 AM    RBC 4.22 02/08/2023 07:53 AM    HGB 12.8 02/08/2023 07:53 AM    HCT 36.6 02/08/2023 07:53 AM     02/08/2023 07:53 AM    MCV 86.9 02/08/2023 07:53 AM    MCH 30.3 02/08/2023 07:53 AM    MCHC 34.8 02/08/2023 07:53 AM    RDW 14.1 02/08/2023 07:53 AM    SEGSPCT 65.0 03/19/2020 10:30 AM    BANDSPCT 2 02/08/2023 07:53 AM    LYMPHOPCT 33.0 02/08/2023 07:53 AM    MONOPCT 10.0 02/08/2023 07:53 AM    BASOPCT 0.0 02/08/2023 07:53 AM    MONOSABS 1.0 02/08/2023 07:53 AM    LYMPHSABS 3.4 02/08/2023 07:53 AM    EOSABS 0.2 02/08/2023 07:53 AM    BASOSABS 0.0 02/08/2023 07:53 AM    DIFFTYPE Auto-K 11/26/2012 01:33 PM       Chemistry        Component Value Date/Time     01/31/2023 1512    K 4.2 01/31/2023 1512    K 3.5 02/26/2020 0319     01/31/2023 1512    CO2 22 01/31/2023 1512    BUN 12 01/31/2023 1512    CREATININE 0.9 01/31/2023 1512        Component Value Date/Time    CALCIUM 9.9 01/31/2023 1512    ALKPHOS 75 01/31/2023 1512    AST 17 01/31/2023 1512    ALT 15 01/31/2023 1512    BILITOT 0.5 01/31/2023 1512          Lab Results   Component Value Date    SEDRATE 8 03/01/2023     Lab Results   Component Value Date    CRP <3.0 03/01/2023     Lab Results   Component Value Date/Time    LENKA Negative 01/31/2023 03:12 PM     Lab Results   Component Value Date/Time    RF <10.0 01/31/2023 03:12 PM     Lab Results   Component Value Date/Time    LENKA Negative 01/31/2023 03:12 PM     No results found for: DSDNAG, DSDNAIGGIFA  No results found for: SSAROAB, SSALAAB  No results found for: SMAB, RNPAB  No results found for: CENTABIGG  No results found for: C3, C4, ACE  Lab Results   Component Value Date/Time    VITD25 31.0 03/01/2023 03:25 PM     No results found for: LABURIC, URICACID  Lab Results   Component Value Date    VEAMOEIB72 524 03/01/2023     Lab Results   Component Value Date    TSHFT4 0.75 09/12/2019    TSH 2.57 01/16/2021     Lab Results   Component Value Date    VITD25 31.0 03/01/2023       ASSESSMENT AND PLAN      Assessment/Plan:      ASSESSMENT:    1. Fibromyalgia    2. Primary osteoarthritis involving multiple joints    3. Sicca syndrome (HCC)        PLAN:      1. Fibromyalgia  Fibromyalgia is a non-life threatening non-rheumatic disease. Discussed diagnosis, pathophysiology and management options with the patient.  I discussed various treatment options with the patient including the medical management and physical therapy/aquatic therapy as well as self exercises. Went over various FDA approved (cymbalta, lyrica, gabapentin, sevalla) and non-fda approved medications (muscle relaxants, ssri's) with the  patient. Written material was provided to the patient on fibromyalgia. CK, B12, TSH, vitamin D normal    continue Cymbalta 60 mg daily, amitriptyline 50 mg at night and Flexeril 10 mg at night as needed. I will also recheck vitamin B12, vitamin D and CK  I would recommend continued follow-up with PCP and/or referral to a pain specialist for further management of fibromyalgia  -Strongly emphasized on low impact aerobic and stretching exercises including biking, swimming, walking, yoga or tiachi classes  -deep breathing and relaxation exercises   -mindfulness techniques  -Counseled on Sleep hygiene   -Advised to drink 64 oz of water   -Limit caffeine intake to 8 oz/day   -     Vitamin B12; Future  -     Vitamin D 25 Hydroxy; Future  -     CK; Future    2. Primary osteoarthritis involving multiple joints  RF, CCP negative. Hand and hip x-rays without significant changes. Most likely primary osteoarthritis. Continue diclofenac 75 mg twice a day as needed. -     Hepatitis C Antibody; Future  -     Hepatitis B Surface Antigen; Future  -     Hepatitis B Core Antibody, IgM; Future    3. Sicca syndrome ([address]  She was previously tested for connective tissue disease which was negative. She now reports worsening dry eyes and dry mouth and would like to be retested. I will recheck LENKA, SSA/SSB and SPEP. I will also recommend Biotene mouthwash, frequent sips of water, sugar-free gum and artificial teardrops  -     Miscellaneous Sendout; Future  -     Electrophoresis Protein, Serum; Future  -     Anti SSA; Future  -     Anti SSB; Future  -     C-Reactive Protein; Future  -     Sedimentation Rate; Future   The patient indicates understanding of these issues and agrees with the plan. Return in about 3 months (around 6/1/2023). The risks and benefits of my recommendations, as well as other treatment options, benefits and side effects werediscussed with the patient. All questions were answered. ######################################################################    I thank you for giving me theopportunity to participate in Hortensia Ainsley care. If you have any questions or concerns please feel free to contact me. I look forward to following  Ele Flores along with you. Electronically signed by: Lili Conteh MD, MD, 3/6/2023 12:26 PM    Documentation was done using voice recognition dragon software. Every effort was made to ensure accuracy;however, inadvertent unintentional computerized transcription errors may be present.

## 2023-03-02 LAB
ANTI-SS-A IGG: <0.2 AI (ref 0–0.9)
ANTI-SS-B IGG: <0.2 AI (ref 0–0.9)
HEPATITIS B CORE IGM ANTIBODY: NORMAL
HEPATITIS B SURFACE ANTIGEN INTERPRETATION: NORMAL
HEPATITIS C ANTIBODY INTERPRETATION: NORMAL
VITAMIN B-12: 524 PG/ML (ref 211–911)
VITAMIN D 25-HYDROXY: 31 NG/ML

## 2023-03-03 LAB
ALBUMIN SERPL-MCNC: 3.7 G/DL (ref 3.1–4.9)
ALPHA-1-GLOBULIN: 0.3 G/DL (ref 0.2–0.4)
ALPHA-2-GLOBULIN: 1.1 G/DL (ref 0.4–1.1)
ANTINUCLEAR AB INTERPRETIVE COMMENT: NORMAL
ANTINUCLEAR ANTIBODY, HEP-2, IGG: NORMAL
BETA GLOBULIN: 1.2 G/DL (ref 0.9–1.6)
GAMMA GLOBULIN: 0.7 G/DL (ref 0.6–1.8)
TOTAL PROTEIN: 6.9 G/DL (ref 6.4–8.2)

## 2023-03-06 LAB — SPE/IFE INTERPRETATION: NORMAL

## 2023-03-08 ENCOUNTER — OFFICE VISIT (OUTPATIENT)
Dept: FAMILY MEDICINE CLINIC | Age: 48
End: 2023-03-08
Payer: COMMERCIAL

## 2023-03-08 VITALS
BODY MASS INDEX: 33.27 KG/M2 | HEART RATE: 84 BPM | HEIGHT: 66 IN | SYSTOLIC BLOOD PRESSURE: 122 MMHG | WEIGHT: 207 LBS | DIASTOLIC BLOOD PRESSURE: 78 MMHG | OXYGEN SATURATION: 97 %

## 2023-03-08 DIAGNOSIS — L50.8 CHRONIC URTICARIA: ICD-10-CM

## 2023-03-08 DIAGNOSIS — D72.829 LEUKOCYTOSIS, UNSPECIFIED TYPE: ICD-10-CM

## 2023-03-08 DIAGNOSIS — D64.9 ANEMIA, UNSPECIFIED TYPE: ICD-10-CM

## 2023-03-08 DIAGNOSIS — R53.83 FATIGUE, UNSPECIFIED TYPE: ICD-10-CM

## 2023-03-08 DIAGNOSIS — R33.9 URINARY RETENTION: Primary | ICD-10-CM

## 2023-03-08 LAB
BILIRUBIN, POC: NEGATIVE
BLOOD URINE, POC: NEGATIVE
CLARITY, POC: NORMAL
COLOR, POC: NORMAL
GLUCOSE URINE, POC: NEGATIVE
KETONES, POC: NEGATIVE
LEUKOCYTE EST, POC: NEGATIVE
NITRITE, POC: NEGATIVE
PH, POC: 6
PROTEIN, POC: NEGATIVE
SPECIFIC GRAVITY, POC: 1.01
UROBILINOGEN, POC: NORMAL

## 2023-03-08 PROCEDURE — 81002 URINALYSIS NONAUTO W/O SCOPE: CPT | Performed by: NURSE PRACTITIONER

## 2023-03-08 PROCEDURE — 81000 URINALYSIS NONAUTO W/SCOPE: CPT | Performed by: NURSE PRACTITIONER

## 2023-03-08 PROCEDURE — 99213 OFFICE O/P EST LOW 20 MIN: CPT | Performed by: NURSE PRACTITIONER

## 2023-03-08 NOTE — PROGRESS NOTES
[address]  [address] [facility] [address]  Dept: [phone]  Dept Fax: [phone]  Loc: [phone]    Ihsan Nunez is a 53 y.o. female who presents today for her medical conditions/complaints as noted below. Ihsan Nunez is c/o of Other (Rash that comes and goes for about a week and a half. Worse at night. Rash with pain on upper and lower extremities )       Subjective:     Chief Complaint   Patient presents with    Other     Rash that comes and goes for about a week and a half. Worse at night. Rash with pain on upper and lower extremities        HPI  Again the patient is being seen for this longstanding history of having a \"rash\" that \"pop up\" intermittently all over her body. Has been on going for several years. She also states that she will have lymph nodes in her neck they become very tender and swollen. She states that the symptoms will last for a few hours to 1 day and then they will resolve. Recently the patient did have evaluation at an allergy and asthma center. Testing was negative to any allergies or food allergies  The patient states that now she is also having spots that are pruritic that keep occurring on and off at bedtime only. The skin on her legs is sensitive to touch. The blankets hurt to touch her legs. The last visit the patient was also seen for some urinary issues  Still voiding every night but has improved. The patient states she feels she is  having urinary retention unless she bends over to force out her urine. The patient also states states that she has tremors on the inside even when she is not anxious.      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 Negative for appetite change, chills, fatigue, fever and unexpected weight change. HENT: Negative. Negative for sore throat and trouble swallowing. Swollen and tender cervical lymph nodes   Eyes:  Negative for photophobia, pain, redness and visual disturbance. Respiratory: Negative. Negative for shortness of breath. Cardiovascular: Negative. Negative for chest pain, palpitations and leg swelling. Gastrointestinal: Negative. Negative for abdominal pain, anal bleeding, blood in stool and nausea. Endocrine: Negative. Genitourinary: Negative. Negative for hematuria. Musculoskeletal:  Positive for myalgias. Skin:  Positive for rash. Allergic/Immunologic: Negative. Neurological: Negative. Negative for dizziness, syncope, light-headedness and numbness. Hematological: Negative. Does not bruise/bleed easily. Psychiatric/Behavioral: Negative. All other systems reviewed and are negative.      Current Outpatient Medications   Medication Sig Dispense Refill    amitriptyline (ELAVIL) 50 MG tablet Take 50 mg by mouth daily      ranolazine (RANEXA) 1000 MG extended release tablet Take 1,000 mg by mouth in the morning and at bedtime      famotidine (PEPCID) 20 MG tablet Take 20 mg by mouth 2 times daily      furosemide (LASIX) 40 MG tablet Take 40 mg by mouth daily      atorvastatin (LIPITOR) 80 MG tablet Take 80 mg by mouth daily      DULoxetine (CYMBALTA) 60 MG extended release capsule Take 1 capsule by mouth daily 30 capsule 5    metoprolol succinate (TOPROL XL) 50 MG extended release tablet Take 50 mg by mouth daily      nitroGLYCERIN (NITRODUR) 0.4 MG/HR       ondansetron (ZOFRAN-ODT) 4 MG disintegrating tablet Take 4 mg by mouth every 12 hours as needed      amLODIPine (NORVASC) 2.5 MG tablet       nitroGLYCERIN (NITROSTAT) 0.4 MG SL tablet PLACE 1 TABLET UNDER THE TONGUE EVERY 5 MINUTES AS NEEDED FOR CHEST PAIN      HYDROcodone-acetaminophen (NORCO)  MG per tablet Every other day      aspirin 81 MG tablet Take 81 mg by mouth daily      Prenatal Vit-Fe Fumarate-FA (PRENATAL PO) Take by mouth      cyclobenzaprine (FLEXERIL) 10 MG tablet Take 1 tablet by mouth 3 times daily as needed  2    diclofenac (VOLTAREN) 75 MG EC tablet Take 1 tablet by mouth 2 times daily as needed      Probiotic Product (PROBIOTIC DAILY PO) Take  by mouth.      sucralfate (CARAFATE) 1 GM/10ML suspension Take 1 g by mouth in the morning and 1 g at noon and 1 g in the evening and 1 g before bedtime. No current facility-administered medications for this visit. No changes in past medical history, past surgical history, social history, orfamily history were noted during the patient encounter unless specifically listed above. All updates of past medical history, past surgical history, social history, or family history were reviewed personally by me duringthe office visit. All problems listed in the assessment are stable unless noted otherwise. Medication profile reviewed personally by me during the office visit. Medication side effects and possible impairments frommedications were discussed as applicable. Objective:     Physical Exam  Vitals and nursing note reviewed. Constitutional:       General: She is not in acute distress. Appearance: Normal appearance. She is well-developed. HENT:      Head: Normocephalic and atraumatic. Right Ear: Tympanic membrane, ear canal and external ear normal.      Left Ear: Tympanic membrane, ear canal and external ear normal.      Nose: Nose normal.      Mouth/Throat:      Pharynx: Uvula midline. No oropharyngeal exudate. Eyes:      General: Lids are normal.      Conjunctiva/sclera: Conjunctivae normal.      Pupils: Pupils are equal, round, and reactive to light. Neck:      Thyroid: No thyromegaly. Vascular: No carotid bruit or JVD.    Cardiovascular: Rate and Rhythm: Normal rate and regular rhythm. Pulses: Normal pulses. Radial pulses are 2+ on the right side and 2+ on the left side. Dorsalis pedis pulses are 2+ on the right side and 2+ on the left side. Posterior tibial pulses are 2+ on the right side and 2+ on the left side. Heart sounds: Normal heart sounds. No murmur heard. No friction rub. No gallop. Pulmonary:      Effort: Pulmonary effort is normal.      Breath sounds: Normal breath sounds. Abdominal:      General: Bowel sounds are normal.      Palpations: Abdomen is soft. There is no mass. Tenderness: There is no abdominal tenderness. Musculoskeletal:         General: Normal range of motion. Cervical back: Normal range of motion and neck supple. Lymphadenopathy:      Head:      Right side of head: No submandibular adenopathy. Left side of head: No submandibular adenopathy. Cervical: No cervical adenopathy. Skin:     General: Skin is warm and dry. Findings: No lesion or rash. Neurological:      Mental Status: She is alert and oriented to person, place, and time. Gait: Gait normal.   Psychiatric:         Speech: Speech normal.         Behavior: Behavior normal.         Thought Content: Thought content normal.         Judgment: Judgment normal.       /78 (Site: Left Upper Arm, Position: Sitting, Cuff Size: Large Adult)   Pulse 84   Ht 5' 6\" (1.676 m)   Wt 207 lb (93.9 kg)   LMP  (LMP Unknown)   SpO2 97%   BMI 33.41 kg/m²   Body mass index is 33.41 kg/m².     BP Readings from Last 2 Encounters:   03/08/23 122/78   03/01/23 118/80       Wt Readings from Last 3 Encounters:   03/08/23 207 lb (93.9 kg)   03/01/23 206 lb 12.8 oz (93.8 kg)   01/31/23 211 lb (95.7 kg)       Lab Review   Orders Only on 03/01/2023   Component Date Value    Hep B Core Ab, IgM 03/01/2023 Non-reactive     Hep B S Ag Interp 03/01/2023 Non-reactive     Hep C Ab Interp 03/01/2023 Non-reactive Total CK 03/01/2023 70     Vit D, 25-Hydroxy 03/01/2023 31.0     Vitamin B-12 03/01/2023 524     Sed Rate 03/01/2023 8     CRP 03/01/2023 <3.0     Anti-SS-B IgG 03/01/2023 <0.2     Anti-SS-A IgG 03/01/2023 <0.2     Total Protein 03/01/2023 6.9     Albumin 03/01/2023 3.7     Mipnt-5-Babzyphw 03/01/2023 0.3     Alpha-2-Globulin 03/01/2023 1.1     Beta Globulin 03/01/2023 1.2     Gamma Globulin 03/01/2023 0.7     Antinuclear Antibody, He* 03/01/2023 <1:80     Antinuclear AB Interpret* 03/01/2023 See Note     SPE/KIKO Interpretation 03/01/2023 REVIEWED    Orders Only on 02/08/2023   Component Date Value    WBC 02/08/2023 10.1     RBC 02/08/2023 4.22     Hemoglobin 02/08/2023 12.8     Hematocrit 02/08/2023 36.6     MCV 02/08/2023 86.9     MCH 02/08/2023 30.3     MCHC 02/08/2023 34.8     RDW 02/08/2023 14.1     Platelets 97/03/4339 295     MPV 02/08/2023 9.3     PLATELET SLIDE REVIEW 02/08/2023 Adequate     SLIDE REVIEW 02/08/2023 see below     Neutrophils % 02/08/2023 52.0     Lymphocytes % 02/08/2023 33.0     Monocytes % 02/08/2023 10.0     Eosinophils % 02/08/2023 2.0     Basophils % 02/08/2023 0.0     Neutrophils Absolute 02/08/2023 5.5     Lymphocytes Absolute 02/08/2023 3.4     Monocytes Absolute 02/08/2023 1.0     Eosinophils Absolute 02/08/2023 0.2     Basophils Absolute 02/08/2023 0.0     Bands Relative 02/08/2023 2     Atypical Lymphocytes Rel* 02/08/2023 1    Office Visit on 01/31/2023   Component Date Value    Color, UA 01/31/2023 yellow     Clarity, UA 01/31/2023 clear     Glucose, UA POC 01/31/2023 neg     Bilirubin, UA 01/31/2023 neg     Ketones, UA 01/31/2023 neg     Spec Grav, UA 01/31/2023 1.020     Blood, UA POC 01/31/2023 neg     pH, UA 01/31/2023 5.5     Protein, UA POC 01/31/2023 neg     Urobilinogen, UA 01/31/2023 0.2     Leukocytes, UA 01/31/2023 neg     Nitrite, UA 01/31/2023 neg     WBC 01/31/2023 14.2 (A)     RBC 01/31/2023 4.70     Hemoglobin 01/31/2023 13.9     Hematocrit 01/31/2023 41.8 MCV 01/31/2023 89.0     MCH 01/31/2023 29.5     MCHC 01/31/2023 33.2     RDW 01/31/2023 13.7     Platelets 41/96/9782 434     MPV 01/31/2023 8.5     Neutrophils % 01/31/2023 72.5     Lymphocytes % 01/31/2023 17.7     Monocytes % 01/31/2023 7.8     Eosinophils % 01/31/2023 0.9     Basophils % 01/31/2023 1.1     Neutrophils Absolute 01/31/2023 10.3 (A)     Lymphocytes Absolute 01/31/2023 2.5     Monocytes Absolute 01/31/2023 1.1     Eosinophils Absolute 01/31/2023 0.1     Basophils Absolute 01/31/2023 0.1     Sodium 01/31/2023 141     Potassium 01/31/2023 4.2     Chloride 01/31/2023 101     CO2 01/31/2023 22     Anion Gap 01/31/2023 18 (A)     Glucose 01/31/2023 130 (A)     BUN 01/31/2023 12     Creatinine 01/31/2023 0.9     Est, Glom Filt Rate 01/31/2023 >60     Calcium 01/31/2023 9.9     Total Protein 01/31/2023 6.7     Albumin 01/31/2023 4.5     Albumin/Globulin Ratio 01/31/2023 2.0     Total Bilirubin 01/31/2023 0.5     Alkaline Phosphatase 01/31/2023 75     ALT 01/31/2023 15     AST 01/31/2023 17     Rheumatoid Factor 01/31/2023 <10.0     Sed Rate 01/31/2023 7     LENKA 01/31/2023 Negative     Pro-BNP 01/31/2023 28     Lyme, EIA 01/31/2023 0.06     TSH 01/31/2023 1.04        No results found for this visit on 03/08/23. Assessment:       1. Urinary retention    2. Anemia, unspecified type    3. Leukocytosis, unspecified type    4. Fatigue, unspecified type        Results for POC orders placed in visit on 03/08/23   POCT Urinalysis no Micro   Result Value Ref Range    Color, UA      Clarity, UA      Glucose, UA POC Negative     Bilirubin, UA Negative     Ketones, UA Negative     Spec Grav, UA 1.015     Blood, UA POC Negative     pH, UA 6.0     Protein, UA POC Negative     Urobilinogen, UA 0.2 E.U./dl     Leukocytes, UA Negative     Nitrite, UA Negative             Plan:       Caitlyn White was seen today for other.     Diagnoses and all orders for this visit:    Urinary retention  -     POC URINE with Microscopic  - Culture, Urine  -     POCT Urinalysis no Micro    Anemia, unspecified type  -     Ferritin  -     Iron and TIBC    Leukocytosis, unspecified type  -     CBC with Auto Differential    Fatigue, unspecified type  -     Thyroid Peroxidase Antibody    Chronic urticaria  Did recommend patient take Zyrtec over-the-counter 10 mg nightly along with Pepcid 20 mg p.o. twice daily    Reviewed and discussed the consult notes from allergist as well as her allergy testing did not reveal a cause for any of her concerns with this intermittent rash  She also has seen rheumatology for work-up for questionable autoimmune which did not reveal any issues  Patient was on some medication in a cardiac clinic trial to make her WBC go up to produce stem cells  \"It was some sort of stem cell injection\"  This was in 2021  Since that time the patient states that her joints have been sore when she claims that she had never had joint pain for (other than her hips) or a rash issue  If labs are normal then I will refer patient to dermatology    Patient has been instructed call the office immediately with new symptoms, change in symptoms or worseningof symptoms. If this is not feasible, patient is instructed to report to the emergency room. Medication profile reviewed. Medication side effects and possible impairments from medications were discussed as applicable. Allergies were reviewed. Health maintenance was reviewed and updated as appropriate. Return if symptoms worsen or fail to improve. (Comment: Please note this report has been produced using a combination of typing and speech recognition software and may contain errors related to that system including errors in grammar, punctuation, and spelling, as well as words and phrases that may be inappropriate.  If there are any questions or concerns please feel free to contact the dictating provider for clarification.)

## 2023-03-09 LAB
BASOPHILS ABSOLUTE: 0.1 K/UL (ref 0–0.2)
BASOPHILS RELATIVE PERCENT: 1.2 %
EOSINOPHILS ABSOLUTE: 0.1 K/UL (ref 0–0.6)
EOSINOPHILS RELATIVE PERCENT: 1.3 %
FERRITIN: 34.2 NG/ML (ref 15–150)
HCT VFR BLD CALC: 39 % (ref 36–48)
HEMOGLOBIN: 13.3 G/DL (ref 12–16)
IRON SATURATION: 41 % (ref 15–50)
IRON: 120 UG/DL (ref 37–145)
LYMPHOCYTES ABSOLUTE: 2.8 K/UL (ref 1–5.1)
LYMPHOCYTES RELATIVE PERCENT: 31 %
MCH RBC QN AUTO: 30.5 PG (ref 26–34)
MCHC RBC AUTO-ENTMCNC: 34.2 G/DL (ref 31–36)
MCV RBC AUTO: 89.3 FL (ref 80–100)
MONOCYTES ABSOLUTE: 0.8 K/UL (ref 0–1.3)
MONOCYTES RELATIVE PERCENT: 9.2 %
NEUTROPHILS ABSOLUTE: 5.1 K/UL (ref 1.7–7.7)
NEUTROPHILS RELATIVE PERCENT: 57.3 %
PDW BLD-RTO: 14.3 % (ref 12.4–15.4)
PLATELET # BLD: 421 K/UL (ref 135–450)
PMV BLD AUTO: 8.7 FL (ref 5–10.5)
RBC # BLD: 4.37 M/UL (ref 4–5.2)
THYROID PEROXIDASE (TPO) ABS: 6 IU/ML
TOTAL IRON BINDING CAPACITY: 290 UG/DL (ref 260–445)
URINE CULTURE, ROUTINE: NORMAL
WBC # BLD: 9 K/UL (ref 4–11)

## 2023-03-13 ASSESSMENT — ENCOUNTER SYMPTOMS
ABDOMINAL PAIN: 0
BLOOD IN STOOL: 0
EYE REDNESS: 0
TROUBLE SWALLOWING: 0
GASTROINTESTINAL NEGATIVE: 1
ANAL BLEEDING: 0
RESPIRATORY NEGATIVE: 1
EYE PAIN: 0
ALLERGIC/IMMUNOLOGIC NEGATIVE: 1
NAUSEA: 0
SORE THROAT: 0
SHORTNESS OF BREATH: 0
PHOTOPHOBIA: 0

## 2023-03-15 RX ORDER — CETIRIZINE HYDROCHLORIDE 10 MG/1
10 TABLET ORAL DAILY
Qty: 30 TABLET | Refills: 2 | Status: SHIPPED | OUTPATIENT
Start: 2023-03-15 | End: 2023-04-14

## 2023-03-15 RX ORDER — FAMOTIDINE 20 MG/1
20 TABLET, FILM COATED ORAL 2 TIMES DAILY
Qty: 60 TABLET | Refills: 2 | Status: SHIPPED | OUTPATIENT
Start: 2023-03-15 | End: 2023-09-11

## 2023-03-20 ENCOUNTER — TELEPHONE (OUTPATIENT)
Dept: ADMINISTRATIVE | Age: 48
End: 2023-03-20

## 2023-04-08 LAB
CHOLESTEROL, TOTAL: 124 MG/DL
CHOLESTEROL/HDL RATIO: ABNORMAL
HDLC SERPL-MCNC: 37 MG/DL (ref 35–70)
LDL CHOLESTEROL CALCULATED: 52 MG/DL (ref 0–160)
NONHDLC SERPL-MCNC: 87 MG/DL
TRIGL SERPL-MCNC: 174 MG/DL
VLDLC SERPL CALC-MCNC: ABNORMAL MG/DL

## 2023-04-18 PROBLEM — R79.89 INCREASED AMMONIA LEVEL: Status: ACTIVE | Noted: 2023-04-18

## 2023-04-19 ENCOUNTER — OFFICE VISIT (OUTPATIENT)
Dept: FAMILY MEDICINE CLINIC | Age: 48
End: 2023-04-19

## 2023-04-19 ENCOUNTER — HOSPITAL ENCOUNTER (OUTPATIENT)
Age: 48
Discharge: HOME | End: 2023-04-19
Payer: COMMERCIAL

## 2023-04-19 VITALS
HEART RATE: 104 BPM | HEIGHT: 66 IN | WEIGHT: 207 LBS | BODY MASS INDEX: 33.27 KG/M2 | SYSTOLIC BLOOD PRESSURE: 130 MMHG | OXYGEN SATURATION: 99 % | DIASTOLIC BLOOD PRESSURE: 78 MMHG

## 2023-04-19 DIAGNOSIS — R74.8 ELEVATED PANCREATIC ENZYME: ICD-10-CM

## 2023-04-19 DIAGNOSIS — R20.0 RIGHT FACIAL NUMBNESS: ICD-10-CM

## 2023-04-19 DIAGNOSIS — R79.89 INCREASED AMMONIA LEVEL: Primary | ICD-10-CM

## 2023-04-19 DIAGNOSIS — R79.89 INCREASED AMMONIA LEVEL: ICD-10-CM

## 2023-04-19 DIAGNOSIS — R41.3 MEMORY PROBLEM: ICD-10-CM

## 2023-04-19 DIAGNOSIS — R47.01 APHASIA: ICD-10-CM

## 2023-04-19 DIAGNOSIS — Z09 HOSPITAL DISCHARGE FOLLOW-UP: ICD-10-CM

## 2023-04-19 LAB
AMMONIA PLAS-SCNC: 25 UMOL/L (ref 11–51)
LIPASE SERPL-CCNC: 33 U/L (ref 13–60)

## 2023-04-19 PROCEDURE — 36415 COLL VENOUS BLD VENIPUNCTURE: CPT

## 2023-04-19 PROCEDURE — 82140 ASSAY OF AMMONIA: CPT

## 2023-04-19 PROCEDURE — 83690 ASSAY OF LIPASE: CPT

## 2023-04-19 RX ORDER — LACTULOSE 10 G/15ML
SOLUTION ORAL
COMMUNITY
Start: 2023-04-18

## 2023-04-19 RX ORDER — GABAPENTIN 100 MG/1
100 CAPSULE ORAL
COMMUNITY
Start: 2023-04-09

## 2023-04-19 SDOH — ECONOMIC STABILITY: TRANSPORTATION INSECURITY
IN THE PAST 12 MONTHS, HAS LACK OF TRANSPORTATION KEPT YOU FROM MEETINGS, WORK, OR FROM GETTING THINGS NEEDED FOR DAILY LIVING?: NO

## 2023-04-19 SDOH — ECONOMIC STABILITY: INCOME INSECURITY: HOW HARD IS IT FOR YOU TO PAY FOR THE VERY BASICS LIKE FOOD, HOUSING, MEDICAL CARE, AND HEATING?: NOT VERY HARD

## 2023-04-19 SDOH — ECONOMIC STABILITY: FOOD INSECURITY: WITHIN THE PAST 12 MONTHS, THE FOOD YOU BOUGHT JUST DIDN'T LAST AND YOU DIDN'T HAVE MONEY TO GET MORE.: NEVER TRUE

## 2023-04-19 SDOH — ECONOMIC STABILITY: FOOD INSECURITY: WITHIN THE PAST 12 MONTHS, YOU WORRIED THAT YOUR FOOD WOULD RUN OUT BEFORE YOU GOT MONEY TO BUY MORE.: NEVER TRUE

## 2023-04-25 NOTE — PROGRESS NOTES
Post-Discharge Transitional Care Management Progress Note      Yvette Toro   YOB: 1975    Date of Office Visit:  4/19/2023  Date of Hospital Admission: 04/07/2023  Date of Hospital Discharge: 04/09/2023    Care management risk score Rising risk (score 2-5) and Complex Care (Scores >=6): No Risk Score On File     Non face to face  following discharge, date last encounter closed (first attempt may have been earlier): *No documented post hospital discharge outreach found in the last 14 days *No documented post hospital discharge outreach found in the last 14 days    Call initiated 2 business days of discharge: *No response recorded in the last 14 days    ASSESSMENT/PLAN:   Increased ammonia level  -     Ammonia; Future  Elevated pancreatic enzyme  -     Lipase; Future  Hospital discharge follow-up  -     OK DISCHARGE MEDS RECONCILED W/ CURRENT OUTPATIENT MED LIST  . Low suspicion for seizure given EEG wnl. MRI/MRA also wnl  Patient states she has been having chronic difficulty with staying on task and losing track of things. Seems to be functioning well; however does state she is concerned her work place is trying to forcer her into [employer]. She states because she has missed so much work they are stating she is going to lose her job unless she files for medical disability. Patient stated her work will be sending over disability paperwork. Educated patient that paperwork should be completed by the specialists that are managing her conditions to determine if disability is appropriate  F/u with GI for CT abd and EGD  F/u with outpatient neurolgy  5/2/2023 will have inpatient EEG     Medical Decision Making: moderate complexity  Return in 3 months (on 7/19/2023). Subjective:   HPI:  Follow up of Hospital problems/diagnosis(es):   1. Increased ammonia level    2.  Elevated pancreatic enzyme              Inpatient course: Discharge summary reviewed-   Per  records as follows:  Rohan Ruiz

## 2023-04-28 ENCOUNTER — TELEPHONE (OUTPATIENT)
Dept: FAMILY MEDICINE CLINIC | Age: 48
End: 2023-04-28

## 2023-04-28 RX ORDER — MELATONIN 10 MG
TABLET, SUBLINGUAL SUBLINGUAL
COMMUNITY

## 2023-04-28 RX ORDER — NALOXONE HYDROCHLORIDE 4 MG/.1ML
1 SPRAY NASAL PRN
COMMUNITY
Start: 2023-04-09

## 2023-04-28 RX ORDER — CETIRIZINE HYDROCHLORIDE 10 MG/1
10 TABLET ORAL DAILY
COMMUNITY

## 2024-02-09 ENCOUNTER — TELEMEDICINE (OUTPATIENT)
Dept: FAMILY MEDICINE CLINIC | Age: 49
End: 2024-02-09
Payer: COMMERCIAL

## 2024-02-09 DIAGNOSIS — R53.83 FATIGUE, UNSPECIFIED TYPE: ICD-10-CM

## 2024-02-09 DIAGNOSIS — R79.89 INCREASED AMMONIA LEVEL: Primary | ICD-10-CM

## 2024-02-09 DIAGNOSIS — L50.8 CHRONIC URTICARIA: ICD-10-CM

## 2024-02-09 DIAGNOSIS — R79.89 INCREASED AMMONIA LEVEL: ICD-10-CM

## 2024-02-09 DIAGNOSIS — R41.3 MEMORY PROBLEM: ICD-10-CM

## 2024-02-09 DIAGNOSIS — R20.2 PARESTHESIAS: ICD-10-CM

## 2024-02-09 LAB
ALBUMIN SERPL-MCNC: 4.9 G/DL (ref 3.4–5)
ALBUMIN/GLOB SERPL: 2.3 {RATIO} (ref 1.1–2.2)
ALP SERPL-CCNC: 68 U/L (ref 40–129)
ALT SERPL-CCNC: 16 U/L (ref 10–40)
AMMONIA PLAS-SCNC: 25 UMOL/L (ref 11–51)
ANION GAP SERPL CALCULATED.3IONS-SCNC: 12 MMOL/L (ref 3–16)
AST SERPL-CCNC: 20 U/L (ref 15–37)
BASOPHILS # BLD: 0.1 K/UL (ref 0–0.2)
BASOPHILS NFR BLD: 1.1 %
BILIRUB SERPL-MCNC: 1.6 MG/DL (ref 0–1)
BUN SERPL-MCNC: 12 MG/DL (ref 7–20)
CALCIUM SERPL-MCNC: 9.9 MG/DL (ref 8.3–10.6)
CHLORIDE SERPL-SCNC: 103 MMOL/L (ref 99–110)
CO2 SERPL-SCNC: 27 MMOL/L (ref 21–32)
CREAT SERPL-MCNC: 0.8 MG/DL (ref 0.6–1.1)
DEPRECATED RDW RBC AUTO: 13.3 % (ref 12.4–15.4)
EOSINOPHIL # BLD: 0.1 K/UL (ref 0–0.6)
EOSINOPHIL NFR BLD: 1.6 %
GFR SERPLBLD CREATININE-BSD FMLA CKD-EPI: >60 ML/MIN/{1.73_M2}
GLUCOSE SERPL-MCNC: 66 MG/DL (ref 70–99)
HCT VFR BLD AUTO: 42 % (ref 36–48)
HGB BLD-MCNC: 14.4 G/DL (ref 12–16)
LYMPHOCYTES # BLD: 2.5 K/UL (ref 1–5.1)
LYMPHOCYTES NFR BLD: 30.6 %
MCH RBC QN AUTO: 29.4 PG (ref 26–34)
MCHC RBC AUTO-ENTMCNC: 34.3 G/DL (ref 31–36)
MCV RBC AUTO: 85.5 FL (ref 80–100)
MONOCYTES # BLD: 0.7 K/UL (ref 0–1.3)
MONOCYTES NFR BLD: 9 %
NEUTROPHILS # BLD: 4.7 K/UL (ref 1.7–7.7)
NEUTROPHILS NFR BLD: 57.7 %
PLATELET # BLD AUTO: 358 K/UL (ref 135–450)
PMV BLD AUTO: 8.9 FL (ref 5–10.5)
POTASSIUM SERPL-SCNC: 4.2 MMOL/L (ref 3.5–5.1)
PROT SERPL-MCNC: 7 G/DL (ref 6.4–8.2)
RBC # BLD AUTO: 4.91 M/UL (ref 4–5.2)
SODIUM SERPL-SCNC: 142 MMOL/L (ref 136–145)
TSH SERPL DL<=0.005 MIU/L-ACNC: 0.85 UIU/ML (ref 0.27–4.2)
WBC # BLD AUTO: 8.1 K/UL (ref 4–11)

## 2024-02-09 PROCEDURE — 99213 OFFICE O/P EST LOW 20 MIN: CPT | Performed by: NURSE PRACTITIONER

## 2024-02-09 RX ORDER — GABAPENTIN 400 MG/1
400 CAPSULE ORAL 2 TIMES DAILY PRN
COMMUNITY

## 2024-02-09 ASSESSMENT — ENCOUNTER SYMPTOMS
GASTROINTESTINAL NEGATIVE: 1
ANAL BLEEDING: 0
ABDOMINAL PAIN: 0
RESPIRATORY NEGATIVE: 1
BLOOD IN STOOL: 0
EYES NEGATIVE: 1
ALLERGIC/IMMUNOLOGIC NEGATIVE: 1
NAUSEA: 0
SHORTNESS OF BREATH: 0

## 2024-02-09 NOTE — PROGRESS NOTES
atraumatic.  [] Abnormal   [x] Mouth/Throat: Mucous membranes are moist.      External Ears [x] Normal  [] Abnormal-      Neck: [x] No visualized mass      Pulmonary/Chest: [x] Respiratory effort normal.  [x] No visualized signs of difficulty breathing or respiratory distress        [] Abnormal-      Musculoskeletal:   [x] Normal gait with no signs of ataxia         [x] Normal range of motion of neck        [] Abnormal-         Neurological:        [x] No Facial Asymmetry (Cranial nerve 7 motor function) (limited exam to video visit)                       [x] No gaze palsy        [] Abnormal-         Skin:                     [x] No significant exanthematous lesions or discoloration noted on facial skin         [] Abnormal-                                  Psychiatric:           [x] Normal Affect [x] No Hallucinations        [] Abnormal     Other pertinent observable physical exam findings-      ASSESSMENT/PLAN:  1. Increased ammonia level  - Ammonia; Future  - Comprehensive Metabolic Panel; Future  - CBC with Auto Differential; Future  - TSH with Reflex; Future    2. Paresthesias      3. Memory problem      4. Fatigue, unspecified type      5. Chronic urticaria    If ammonia level comes back elevated then I do recommend she follow-up with her GI for further evaluation as to the cause.  If the ammonia level returns normal I recommend that she follow-up with neurology since this is who has been handling the majority of her symptoms    Patient has been instructed call the office immediately with new symptoms, change in symptoms or worseningof symptoms. If this is not feasible, patient is instructed to report to the emergency room. Medication profile reviewed. Medication side effects and possible impairments from medications were discussed as applicable.Allergies were reviewed. Health maintenance was reviewed and updated as appropriate.       Jsoe Antonio Johnston, was evaluated through a synchronous (real-time)

## 2024-03-04 ENCOUNTER — PATIENT MESSAGE (OUTPATIENT)
Dept: FAMILY MEDICINE CLINIC | Age: 49
End: 2024-03-04

## 2024-03-04 RX ORDER — BUSPIRONE HYDROCHLORIDE 10 MG/1
10 TABLET ORAL 3 TIMES DAILY
Qty: 90 TABLET | Refills: 0 | Status: SHIPPED | OUTPATIENT
Start: 2024-03-04 | End: 2024-04-03

## 2024-03-04 NOTE — TELEPHONE ENCOUNTER
From: Jose Antonio Johnston  To: Aye Pereira Gracie  Sent: 3/4/2024 8:52 AM EST  Subject: Sypmtoms    I have an ultrasound of my liver scheduled for 3/6. He is checking my liver but mentioned UCD’s and biopsies. There are genetic tests that can be ordered to check for them and you can order them, I do not need a specialist to do it. I am loosing my mind. They scheduled the test back in February but Heide had the baby the same day so I missed it. I am either getting beyond angry and OMG blowing up which I have never done or I am just crying. I been waking up and sad I have to go through another day because I am so physically and emotionally exhausted. I am seeing hardly any peace right now and understand test need done but would prefer they not be spread out. I honestly don’t know how much longer I can do this. I am literally on the verge of a nervous breakdown. Is it possible for you to get those test going so we can answers sooner.

## 2024-03-11 RX ORDER — CETIRIZINE HYDROCHLORIDE 10 MG/1
10 TABLET ORAL DAILY
Qty: 90 TABLET | Refills: 1 | Status: SHIPPED | OUTPATIENT
Start: 2024-03-11

## 2024-03-11 NOTE — TELEPHONE ENCOUNTER
Refill Request     CONFIRM preferrred pharmacy with the patient.    If Mail Order Rx - Pend for 90 day refill.      Last Seen: Last Seen Department: 2/9/2024  Last Seen by PCP: 2/9/2024    Last Written: 3/15/2023    If no future appointment scheduled, route STAFF MESSAGE with patient name to the  Pool for scheduling.      Next Appointment:   No future appointments.    Message sent to  to schedule appt with patient?  NO      Requested Prescriptions     Pending Prescriptions Disp Refills    cetirizine (ZYRTEC) 10 MG tablet [Pharmacy Med Name: CETIRIZINE 10MG TABLETS] 90 tablet      Sig: TAKE 1 TABLET BY MOUTH DAILY

## 2024-07-18 ENCOUNTER — OFFICE VISIT (OUTPATIENT)
Dept: FAMILY MEDICINE CLINIC | Age: 49
End: 2024-07-18
Payer: COMMERCIAL

## 2024-07-18 VITALS
HEART RATE: 88 BPM | WEIGHT: 161 LBS | BODY MASS INDEX: 25.88 KG/M2 | SYSTOLIC BLOOD PRESSURE: 120 MMHG | DIASTOLIC BLOOD PRESSURE: 80 MMHG | OXYGEN SATURATION: 98 % | HEIGHT: 66 IN

## 2024-07-18 DIAGNOSIS — R79.89 INCREASED AMMONIA LEVEL: Primary | ICD-10-CM

## 2024-07-18 DIAGNOSIS — Z13.31 POSITIVE DEPRESSION SCREENING: ICD-10-CM

## 2024-07-18 DIAGNOSIS — E16.2 HYPOGLYCEMIA: ICD-10-CM

## 2024-07-18 PROCEDURE — 99215 OFFICE O/P EST HI 40 MIN: CPT | Performed by: NURSE PRACTITIONER

## 2024-07-18 RX ORDER — POLYETHYLENE GLYCOL 3350 17 G/17G
17 POWDER, FOR SOLUTION ORAL DAILY PRN
COMMUNITY

## 2024-07-18 RX ORDER — AMLODIPINE BESYLATE 2.5 MG/1
2.5 TABLET ORAL DAILY
COMMUNITY

## 2024-07-18 ASSESSMENT — PATIENT HEALTH QUESTIONNAIRE - PHQ9
6. FEELING BAD ABOUT YOURSELF - OR THAT YOU ARE A FAILURE OR HAVE LET YOURSELF OR YOUR FAMILY DOWN: SEVERAL DAYS
5. POOR APPETITE OR OVEREATING: NEARLY EVERY DAY
4. FEELING TIRED OR HAVING LITTLE ENERGY: SEVERAL DAYS
2. FEELING DOWN, DEPRESSED OR HOPELESS: SEVERAL DAYS
SUM OF ALL RESPONSES TO PHQ QUESTIONS 1-9: 11
9. THOUGHTS THAT YOU WOULD BE BETTER OFF DEAD, OR OF HURTING YOURSELF: NOT AT ALL
7. TROUBLE CONCENTRATING ON THINGS, SUCH AS READING THE NEWSPAPER OR WATCHING TELEVISION: SEVERAL DAYS
3. TROUBLE FALLING OR STAYING ASLEEP: MORE THAN HALF THE DAYS
8. MOVING OR SPEAKING SO SLOWLY THAT OTHER PEOPLE COULD HAVE NOTICED. OR THE OPPOSITE - BEING SO FIDGETY OR RESTLESS THAT YOU HAVE BEEN MOVING AROUND A LOT MORE THAN USUAL: SEVERAL DAYS
1. LITTLE INTEREST OR PLEASURE IN DOING THINGS: SEVERAL DAYS
10. IF YOU CHECKED OFF ANY PROBLEMS, HOW DIFFICULT HAVE THESE PROBLEMS MADE IT FOR YOU TO DO YOUR WORK, TAKE CARE OF THINGS AT HOME, OR GET ALONG WITH OTHER PEOPLE: EXTREMELY DIFFICULT

## 2024-07-18 NOTE — PROGRESS NOTES
03/26/2024 165     GLYCINE 03/26/2024 517 (H)     ALANINE 03/26/2024 427     CITRULLINE 03/26/2024 30     VALINE 03/26/2024 178     METHIONINE 03/26/2024 20     CYSTINE 03/26/2024 38     ISOLEUCINE 03/26/2024 54     LEUCINE 03/26/2024 98     TYROSINE 03/26/2024 49     PHENYLALNINE 03/26/2024 61     HOMOCYSTINE 03/26/2024 <4     ORNITHINE 03/26/2024 121 (H)     LYSINE 03/26/2024 172     HISTIDINE 03/26/2024 83     ARGININE 03/26/2024 96     Interpretation 03/26/2024                      Value:Indication: intermittent hyperammonemia.  In this sample, the glutamine, alanine, citrulline, and arginine are all normal.  No argininosuccinic acid present.  Ornithine is above the reference range but not in the range for HHH syndrome ( where the expectation would be to find ornithine around 500 uM).  No diagnostic amino acid pattern.      Carnitine, Total 03/26/2024 61.7     Carnitine, Free 03/26/2024 45.6     CARNITINE, SHRT CHAIN 03/26/2024 16.1     Organic Acid Interpretat* 03/26/2024 Indication:  intermittent hyperammonemia.  No increase in lactate, ketones, or methylmalonic.  No orotic detected.  Ortho- hippurate is noted- this is an aspirin metabolite.  No diagnostic organic acid pattern.     Disclaimer 03/26/2024                      Value:This test was developed and its performance characteristics were determined and validated by the Clinical [facility] Spectrom[facility] Laboratory at [facility].  It has not been cleared or approved by the US Food and Drug Administration.  This laboratory is certified under the Clinical Laboratory Improvement Amendments of 1988 (CLIA 88) as qualified to perform high-complexity laboratory testing.        Document 03/26/2024                            No results found for this visit on 07/18/24.       Assessment:       1. Increased ammonia level    2. Hypoglycemia    3. Positive depression screening        No results found for this visit on 07/18/24.         Plan:

## 2024-07-19 ENCOUNTER — TELEPHONE (OUTPATIENT)
Dept: ENDOCRINOLOGY | Age: 49
End: 2024-07-19

## 2024-07-22 ENCOUNTER — PATIENT MESSAGE (OUTPATIENT)
Dept: FAMILY MEDICINE CLINIC | Age: 49
End: 2024-07-22

## 2024-07-22 DIAGNOSIS — E72.20 HYPERAMMONEMIA (HCC): Primary | ICD-10-CM

## 2024-07-22 NOTE — TELEPHONE ENCOUNTER
Referral placed and call the number and got the fax number, referral faxed. And patient called and informed of referral faxed and informed that if she doesn't hear from the [facility] Queen office in 3-5 days to call his office to schedule

## 2024-07-22 NOTE — TELEPHONE ENCOUNTER
From: Jose Antonio Johnston  To: Aye Bowman  Sent: 7/22/2024 12:20 PM EDT  Subject: Gastro referral    I have done the research we discussed and found that, I would like to try to schedule with Dr Sergei Queen at [facility] and there number is listed as [phone]. Please let me know if they will call me or I have to call them. That you so much for all your help and cares.

## 2024-07-24 ASSESSMENT — ENCOUNTER SYMPTOMS
ANAL BLEEDING: 0
NAUSEA: 0
ALLERGIC/IMMUNOLOGIC NEGATIVE: 1
EYES NEGATIVE: 1
GASTROINTESTINAL NEGATIVE: 1
ABDOMINAL PAIN: 0
SHORTNESS OF BREATH: 0
RESPIRATORY NEGATIVE: 1
BLOOD IN STOOL: 0

## 2024-08-12 ENCOUNTER — TELEPHONE (OUTPATIENT)
Dept: FAMILY MEDICINE CLINIC | Age: 49
End: 2024-08-12

## 2024-08-12 DIAGNOSIS — R79.89 INCREASED AMMONIA LEVEL: Primary | ICD-10-CM

## 2024-08-12 NOTE — TELEPHONE ENCOUNTER
Patient states she was referred to [facility] liver specialist and reports that they cannot get her in until October of next year. States that she found a provider in [place] that she would like to see but will need a referral and all pertinent notes/labs/imaging faxed to:       Dr. Boris Milton III, Hepatologist  [address], [place] [address]     Phone [phone]    Fax [phone]

## 2024-08-14 ENCOUNTER — TELEPHONE (OUTPATIENT)
Dept: FAMILY MEDICINE CLINIC | Age: 49
End: 2024-08-14

## 2024-08-14 NOTE — TELEPHONE ENCOUNTER
The patient just saw neurology on 7/26/2024 for this issue.  Patient should have reasonable accommodations for her work filled out by neurology

## 2024-08-14 NOTE — TELEPHONE ENCOUNTER
Patient asking for a  completion of reasonable accommodations to be able to work from home on her bad days where she can not drive due to her confusion until they figure out what is going on with her liver.

## 2024-10-31 ENCOUNTER — TELEPHONE (OUTPATIENT)
Dept: FAMILY MEDICINE CLINIC | Age: 49
End: 2024-10-31

## 2024-10-31 NOTE — TELEPHONE ENCOUNTER
Spoke with patient.  She scheduled herself an acute appt today at 4:40 for having a hard time talking, walking and fatigue.  Seen at  yesterday.  Advised patient to follow up with her Neurologist

## 2024-11-14 ENCOUNTER — HOSPITAL ENCOUNTER (OUTPATIENT)
Age: 49
Discharge: HOME | End: 2024-11-14
Payer: COMMERCIAL

## 2024-11-14 ENCOUNTER — OFFICE VISIT (OUTPATIENT)
Dept: FAMILY MEDICINE CLINIC | Age: 49
End: 2024-11-14
Payer: COMMERCIAL

## 2024-11-14 VITALS
SYSTOLIC BLOOD PRESSURE: 118 MMHG | BODY MASS INDEX: 25.99 KG/M2 | HEIGHT: 66 IN | OXYGEN SATURATION: 100 % | DIASTOLIC BLOOD PRESSURE: 70 MMHG | HEART RATE: 98 BPM

## 2024-11-14 DIAGNOSIS — G93.40 ENCEPHALOPATHY, UNSPECIFIED TYPE: ICD-10-CM

## 2024-11-14 DIAGNOSIS — R53.1 WEAKNESS: ICD-10-CM

## 2024-11-14 DIAGNOSIS — E72.20 HYPERAMMONEMIA (HCC): ICD-10-CM

## 2024-11-14 DIAGNOSIS — R71.8 ELEVATED HEMATOCRIT: ICD-10-CM

## 2024-11-14 DIAGNOSIS — R20.2 PARESTHESIAS: Primary | ICD-10-CM

## 2024-11-14 LAB
AMMONIA PLAS-SCNC: 26 UMOL/L (ref 11–51)
BASOPHILS # BLD: 0.1 K/UL (ref 0–0.2)
BASOPHILS NFR BLD: 0.8 %
DEPRECATED RDW RBC AUTO: 14.3 % (ref 12.4–15.4)
EOSINOPHIL # BLD: 0.1 K/UL (ref 0–0.6)
EOSINOPHIL NFR BLD: 0.8 %
HCT VFR BLD AUTO: 41.6 % (ref 36–48)
HGB BLD-MCNC: 13.6 G/DL (ref 12–16)
LYMPHOCYTES # BLD: 2.3 K/UL (ref 1–5.1)
LYMPHOCYTES NFR BLD: 28.1 %
MCH RBC QN AUTO: 29 PG (ref 26–34)
MCHC RBC AUTO-ENTMCNC: 32.8 G/DL (ref 31–36)
MCV RBC AUTO: 88.6 FL (ref 80–100)
MONOCYTES # BLD: 0.6 K/UL (ref 0–1.3)
MONOCYTES NFR BLD: 7.9 %
NEUTROPHILS # BLD: 5 K/UL (ref 1.7–7.7)
NEUTROPHILS NFR BLD: 62.4 %
PLATELET # BLD AUTO: 416 K/UL (ref 135–450)
PMV BLD AUTO: 7.3 FL (ref 5–10.5)
RBC # BLD AUTO: 4.7 M/UL (ref 4–5.2)
WBC # BLD AUTO: 8.1 K/UL (ref 4–11)

## 2024-11-14 PROCEDURE — 99215 OFFICE O/P EST HI 40 MIN: CPT | Performed by: NURSE PRACTITIONER

## 2024-11-14 PROCEDURE — 85025 COMPLETE CBC W/AUTO DIFF WBC: CPT

## 2024-11-14 PROCEDURE — 36415 COLL VENOUS BLD VENIPUNCTURE: CPT

## 2024-11-14 PROCEDURE — 82140 ASSAY OF AMMONIA: CPT

## 2024-11-14 NOTE — PROGRESS NOTES
Ambulatory Patient Note Following Recent ED Visit   Subjective:     Chief Complaint   Patient presents with    Other     ER follow up       HPI  Recent ED visit for weakness   Jose Antonio Johnston  is a 49 y.o. female who presents today for evaluation following a recent ED visit related to the above mentioned issue.     The patient actually went to the emergency room at the [facility] on 10/30/2024.  Patient went due to bilateral lower extremity weakness and an episode at work where she \"became gray\" and had a hard time walking.  The patient was not admitted to [facility] and was sent home after no acute reason seen to be able to admit her  The patient states that she was upset and that she could barely walk so she then proceeded to go to [facility] on 10/31/2024.  The patient was admitted to the hospital from 10/31/2024 until she was discharged on 11/3/2024.  The patient complains of generalized weakness but mostly in her bilateral lower extremities.  Patient states that when she is up trying to do anything for more than 15 minutes she starts with extreme weakness.  She states then she will get \"whole body shaking.  Everywhere in my arms, legs and my head\".  She tells me these are more like tremors than actual seizure-like activity she states that since being discharged from the hospital she can at least walk however she continues to have extreme weakness.  She feels like her legs are extremely heavy at times like she cannot pick them up and then other times they will just \"turn to Jell-O\".  The patient states that she is concerned because she just does not feel that she can go back to work.  Overall she continues to state that she cannot continue to function \"this way\".  The daughter is present today who states that her mom never goes out with friends and cannot hardly even hold the grandbaby anymore.  The patient

## 2024-11-18 ASSESSMENT — ENCOUNTER SYMPTOMS
RESPIRATORY NEGATIVE: 1
BLOOD IN STOOL: 0
EYES NEGATIVE: 1
ABDOMINAL PAIN: 0
NAUSEA: 0
ALLERGIC/IMMUNOLOGIC NEGATIVE: 1
GASTROINTESTINAL NEGATIVE: 1
SHORTNESS OF BREATH: 0
ANAL BLEEDING: 0

## 2024-11-26 DIAGNOSIS — E72.20 HYPERAMMONEMIA (HCC): ICD-10-CM

## 2024-11-26 DIAGNOSIS — R71.8 ELEVATED HEMATOCRIT: Primary | ICD-10-CM

## 2024-11-27 ENCOUNTER — TELEPHONE (OUTPATIENT)
Dept: FAMILY MEDICINE CLINIC | Age: 49
End: 2024-11-27

## 2024-12-04 ENCOUNTER — TELEPHONE (OUTPATIENT)
Dept: FAMILY MEDICINE CLINIC | Age: 49
End: 2024-12-04

## 2024-12-04 NOTE — TELEPHONE ENCOUNTER
Please call patient and inform her that I received her EMG results.  EMG showed this was a normal study of the lower extremities.  There is no evidence to indicate any type of peripheral neuropathy, myopathic disorder, lumbar sacral radiculopathy or disorder of excessive muscle activity  Again EMG was completely normal  Follow-up with neurology as previously recommended

## 2024-12-05 NOTE — TELEPHONE ENCOUNTER
wandy    Pt notified and she has already seen neuro. States they told her they do not feel this is a neurological issue they feel this is a issue with her liver. She has an appt with GI on 12/24/24 to discuss this all more in depth

## 2024-12-26 ENCOUNTER — CARE COORDINATION (OUTPATIENT)
Dept: CARE COORDINATION | Age: 49
End: 2024-12-26

## 2024-12-27 ENCOUNTER — CARE COORDINATION (OUTPATIENT)
Dept: CARE COORDINATION | Age: 49
End: 2024-12-27

## 2024-12-27 NOTE — CARE COORDINATION
Ambulatory Care Coordination Note     12/27/2024 12:46 PM     Patient outreach attempt by this AC today to offer care management services. AC was unable to reach the patient by telephone today;  attempting to assess for care coordination needs   left voice message requesting a return phone call to this ACM.  mychart message sent requesting patient to contact this AC.     ACM: Yesi Prado RN    PCP/Specialist follow up:       Follow Up:   Plan for next AC outreach in approximately 1 week to complete:  2nd CC program outreach needed. Screen for needs  .

## 2025-01-02 ENCOUNTER — CARE COORDINATION (OUTPATIENT)
Dept: CARE COORDINATION | Age: 50
End: 2025-01-02

## 2025-01-02 NOTE — CARE COORDINATION
Ambulatory Care Coordination Note     1/2/2025 11:15 AM     patient outreach attempt by this AC today to offer care management services. ACM was unable to reach the patient by telephone today;   left voice message requesting a return phone call to this ACM.     Patient closed (unable to reach patient) from the High Risk Care Management program on 1/2/2025.  Patient  UTR x 3  .  Care management goals have been completed. No further Ambulatory Care Manager follow up scheduled.

## 2025-01-24 ENCOUNTER — TELEPHONE (OUTPATIENT)
Dept: FAMILY MEDICINE CLINIC | Age: 50
End: 2025-01-24

## 2025-01-24 NOTE — TELEPHONE ENCOUNTER
Patient called in tearful and requesting a PCP visit to find a solution on her increasing weakness, fatigue and unintentional weight loss.   She is very concerned for her declining health.    Patient states she was at [facility] ED on 25.  The [facility]  25.   They plan to have her wear a cardiac monitor.  She is having labs drawn today per cardiology order.  Oncology  Elaine Sanchez MD   25   [facility] Initial Follow Up Call  Outreach made within 2 business days of discharge: Yes  Patient: Jose Antonio Johnston Patient : 1975   MRN: [id number]  Reason for Admission: Weakness and fatigue  Discharge Date: 25   Final diagnoses: Transient hypotension      Spoke with: Jose Antonio  Discharge department/facility: [facility]   Non-face-to-face services provided:  Scheduled appointment with PCP-   Obtained and reviewed discharge summary and/or continuity of care documents    TCM Interactive Patient Contact:  Was patient able to fill all prescriptions: Yes  Was patient instructed to bring all medications to the follow-up visit: Yes  Is patient taking all medications as directed in the discharge summary? Yes  Does patient understand their discharge instructions: Yes    Does patient have questions or concerns that need addressed prior to 7-14 day follow up office visit: yes -   Saw Cardiology today. 25.  Is getting more labs and  heart monitor  Wt is 133# today.  Reason for Visit   Reason Comments   Fatigue Growing for months Last week : mid the weekend was really bad Can't go short distance   Dizziness Passing out feeling : feels like she coming close Havent in a long time   Funmilayo Knight NP - 2025 10:30 AM EST   Formatting of this note might be different from the original.  Medication changes  decrease metoprolol to 25 mg daily (1/2 tab of your 50 mg tablet) due to low blood pressure, take at night

## 2025-01-27 ENCOUNTER — TELEPHONE (OUTPATIENT)
Dept: FAMILY MEDICINE CLINIC | Age: 50
End: 2025-01-27

## 2025-01-27 NOTE — TELEPHONE ENCOUNTER
Patient's cardiologist just adjusted her medication related to her blood pressure and heart rate  Also patient has been seeing specialist for the muscle weakness.  She will need to get a letter from 1 of these 2 specialist since I am not managing any of these issues

## 2025-01-27 NOTE — TELEPHONE ENCOUNTER
Pt states that she can't  hardly walk, heart rate and blood pressure is going everywhere. Her legs shake a lot, she ws wanting to see if she could get a letter so she can work from home till she gets in here and see's you this week.

## 2025-01-27 NOTE — TELEPHONE ENCOUNTER
Called pt, she was upset and stated that Cardiology and the muscle specialist have both stated that they do not feel her condition is related to either of them and have told her to reach out to her PCP for a work note and further care. Pt stated nobody is managing her issues.

## 2025-02-01 ENCOUNTER — PATIENT MESSAGE (OUTPATIENT)
Dept: FAMILY MEDICINE CLINIC | Age: 50
End: 2025-02-01

## 2025-02-03 DIAGNOSIS — M62.81 MUSCLE WEAKNESS (GENERALIZED): Primary | ICD-10-CM

## 2025-02-05 ENCOUNTER — OFFICE VISIT (OUTPATIENT)
Dept: FAMILY MEDICINE CLINIC | Age: 50
End: 2025-02-05

## 2025-02-05 ENCOUNTER — HOSPITAL ENCOUNTER (OUTPATIENT)
Age: 50
Discharge: HOME | End: 2025-02-05
Payer: COMMERCIAL

## 2025-02-05 VITALS
OXYGEN SATURATION: 100 % | WEIGHT: 133 LBS | HEART RATE: 98 BPM | SYSTOLIC BLOOD PRESSURE: 110 MMHG | HEIGHT: 66 IN | BODY MASS INDEX: 21.38 KG/M2 | DIASTOLIC BLOOD PRESSURE: 62 MMHG

## 2025-02-05 DIAGNOSIS — M62.81 MUSCLE WEAKNESS: ICD-10-CM

## 2025-02-05 DIAGNOSIS — R53.83 OTHER FATIGUE: ICD-10-CM

## 2025-02-05 DIAGNOSIS — M62.81 MUSCLE WEAKNESS: Primary | ICD-10-CM

## 2025-02-05 PROCEDURE — 86042 ACETYLCHOLN RCPTR BLCKG ANTB: CPT

## 2025-02-05 PROCEDURE — 36415 COLL VENOUS BLD VENIPUNCTURE: CPT

## 2025-02-05 PROCEDURE — 86366 MUSCLE-SPECIFIC KINASE ANTB: CPT

## 2025-02-05 RX ORDER — NORTRIPTYLINE HYDROCHLORIDE 10 MG/1
10 CAPSULE ORAL NIGHTLY
COMMUNITY
Start: 2025-01-23

## 2025-02-05 NOTE — PROGRESS NOTES
Jose Antonio Johnston (:  1975) is a 50 y.o. female, here for evaluation of the following chief complaint(s):  Follow-up (Jaron, 25, hypotension. )      1. Muscle weakness  -     ACETYLCHOLINE RECEPTOR, BLOCKING; Future  -     MISCELLANEOUS LAB TEST #1; Future  2. Other fatigue       Assessment & Plan  1. Leg weakness and fatigue.  Her EMG results were normal, indicating no muscle or neuropathic disorders. However, her ammonia levels have remained stable, and her protein and albumin levels were normal as of 2024. The SPEP test did not reveal any protein spikes indicative of cancer.   It was explained that disability can not be provided without a definitive diagnosis, which requires a qualifying disabling diagnosis. She was advised to maintain her scheduled appointment with the neurologist on 2024. She was also encouraged to seek an urgent appointment at the [facility] and inquire about any urgent appointments that have been canceled in the past 24 hours.    2. Suspected Myasthenia Gravis.--- Patient reports that while someone she  saw had concern for possible myasthenia gravis  She has been referred to a neuromuscular doctor at  [facility], but due to normal EMG results, the neurologist will not be able to see her. She was advised to follow up with the neurologist next week and wait for the neuromuscular doctor's evaluation.     3. Encephalopathy.  Her ammonia levels have been normal, and she is not currently on Xifaxan due to insurance issues. She was advised to continue monitoring her symptoms and to follow up with her GI specialist at , as they are likely contributing to her encephalopathy.   She was also advised to seek a new GI specialist due to dissatisfaction with her current one.  The patient again continues to states she believes that her ammonia levels being elevated has caused this problem.  As far as what I can tell the patient only had 1 abnormal ammonia level

## 2025-02-06 ASSESSMENT — ENCOUNTER SYMPTOMS
ANAL BLEEDING: 0
ABDOMINAL PAIN: 0
EYES NEGATIVE: 1
BLOOD IN STOOL: 0
NAUSEA: 0
SHORTNESS OF BREATH: 0
RESPIRATORY NEGATIVE: 1
GASTROINTESTINAL NEGATIVE: 1
ALLERGIC/IMMUNOLOGIC NEGATIVE: 1

## 2025-02-08 LAB — ACHR BLOCK AB/ACHR TOTAL SFR SER: 4 % (ref 0–26)

## 2025-02-10 LAB — MISCELLANEOUS LAB TEST ORDER: NORMAL

## 2025-04-29 ENCOUNTER — RESULTS FOLLOW-UP (OUTPATIENT)
Dept: FAMILY MEDICINE CLINIC | Age: 50
End: 2025-04-29

## 2025-06-11 ENCOUNTER — OFFICE VISIT (OUTPATIENT)
Dept: FAMILY MEDICINE CLINIC | Age: 50
End: 2025-06-11

## 2025-06-11 VITALS
HEIGHT: 66 IN | HEART RATE: 72 BPM | SYSTOLIC BLOOD PRESSURE: 122 MMHG | DIASTOLIC BLOOD PRESSURE: 68 MMHG | BODY MASS INDEX: 21.53 KG/M2 | WEIGHT: 134 LBS | OXYGEN SATURATION: 99 %

## 2025-06-11 DIAGNOSIS — G90.A POTS (POSTURAL ORTHOSTATIC TACHYCARDIA SYNDROME): ICD-10-CM

## 2025-06-11 DIAGNOSIS — F41.9 ANXIETY: ICD-10-CM

## 2025-06-11 DIAGNOSIS — R41.89 BRAIN FOG: ICD-10-CM

## 2025-06-11 DIAGNOSIS — F51.01 PRIMARY INSOMNIA: Primary | ICD-10-CM

## 2025-06-11 RX ORDER — MIDODRINE HYDROCHLORIDE 2.5 MG/1
2.5 TABLET ORAL 2 TIMES DAILY
COMMUNITY
Start: 2025-05-14

## 2025-06-11 RX ORDER — HYDROXYZINE PAMOATE 25 MG/1
25 CAPSULE ORAL 3 TIMES DAILY PRN
Qty: 90 CAPSULE | Refills: 0 | Status: SHIPPED | OUTPATIENT
Start: 2025-06-11

## 2025-06-11 RX ORDER — QUETIAPINE FUMARATE 50 MG/1
50 TABLET, EXTENDED RELEASE ORAL NIGHTLY
Qty: 30 TABLET | Refills: 1 | Status: SHIPPED | OUTPATIENT
Start: 2025-06-11

## 2025-06-11 SDOH — ECONOMIC STABILITY: FOOD INSECURITY: WITHIN THE PAST 12 MONTHS, THE FOOD YOU BOUGHT JUST DIDN'T LAST AND YOU DIDN'T HAVE MONEY TO GET MORE.: NEVER TRUE

## 2025-06-11 SDOH — ECONOMIC STABILITY: FOOD INSECURITY: WITHIN THE PAST 12 MONTHS, YOU WORRIED THAT YOUR FOOD WOULD RUN OUT BEFORE YOU GOT MONEY TO BUY MORE.: NEVER TRUE

## 2025-06-11 ASSESSMENT — PATIENT HEALTH QUESTIONNAIRE - PHQ9
3. TROUBLE FALLING OR STAYING ASLEEP: NOT AT ALL
SUM OF ALL RESPONSES TO PHQ QUESTIONS 1-9: 6
SUM OF ALL RESPONSES TO PHQ QUESTIONS 1-9: 6
8. MOVING OR SPEAKING SO SLOWLY THAT OTHER PEOPLE COULD HAVE NOTICED. OR THE OPPOSITE, BEING SO FIGETY OR RESTLESS THAT YOU HAVE BEEN MOVING AROUND A LOT MORE THAN USUAL: SEVERAL DAYS
9. THOUGHTS THAT YOU WOULD BE BETTER OFF DEAD, OR OF HURTING YOURSELF: NOT AT ALL
10. IF YOU CHECKED OFF ANY PROBLEMS, HOW DIFFICULT HAVE THESE PROBLEMS MADE IT FOR YOU TO DO YOUR WORK, TAKE CARE OF THINGS AT HOME, OR GET ALONG WITH OTHER PEOPLE: VERY DIFFICULT
5. POOR APPETITE OR OVEREATING: NOT AT ALL
1. LITTLE INTEREST OR PLEASURE IN DOING THINGS: NOT AT ALL
SUM OF ALL RESPONSES TO PHQ QUESTIONS 1-9: 6
7. TROUBLE CONCENTRATING ON THINGS, SUCH AS READING THE NEWSPAPER OR WATCHING TELEVISION: SEVERAL DAYS
4. FEELING TIRED OR HAVING LITTLE ENERGY: NEARLY EVERY DAY
6. FEELING BAD ABOUT YOURSELF - OR THAT YOU ARE A FAILURE OR HAVE LET YOURSELF OR YOUR FAMILY DOWN: NOT AT ALL
SUM OF ALL RESPONSES TO PHQ QUESTIONS 1-9: 6
2. FEELING DOWN, DEPRESSED OR HOPELESS: SEVERAL DAYS

## 2025-06-11 ASSESSMENT — ENCOUNTER SYMPTOMS
NAUSEA: 0
SHORTNESS OF BREATH: 0
ABDOMINAL PAIN: 0
RESPIRATORY NEGATIVE: 1
BLOOD IN STOOL: 0
ALLERGIC/IMMUNOLOGIC NEGATIVE: 1
ANAL BLEEDING: 0
GASTROINTESTINAL NEGATIVE: 1
EYES NEGATIVE: 1

## 2025-06-11 NOTE — PROGRESS NOTES
the assessment are stable unless noted otherwise.      Call if pattern of symptoms change or persists for an extended time.    Subjective   History of Present Illness  The patient is a 50-year-old female who presents today for follow-up. She recently had a tilt table test and was diagnosed with POTS. She is seeing cardiology, who started her on midodrine. She states that she is having trouble with insomnia and would like medication until she can get in to see neurology in 08/2025.    She reports experiencing sleep disturbances, characterized by difficulty falling asleep, which she attributes to dehydration. She has identified a correlation between her hydration status and sleep quality, noting that electrolyte intake facilitates sleep onset. However, she experiences awakenings at approximately 4:56 AM each morning, often necessitating urination, after which she finds it challenging to return to sleep. She does not engage in daytime napping and reports feeling fatigued to the point of immobility. She has previously tried trazodone for sleep management.    She also describes episodes of anxiety, which she believes are adrenaline surges associated with POTS. These surges are described as sudden bursts of energy, akin to standing on the edge of a kelly, followed by a sensation of skin crawling that persists for several hours. She has previously used buspirone for anxiety but found Benadryl to be more effective, although she is hesitant to use it daily.    She expresses concern about potential comorbidities associated with POTS, particularly mast cell activation syndrome (MCAS) and Mar-Danlos syndrome (EDS), and requests testing for these conditions. She has a history of ischemic ulcers in her colon, which have been largely ignored by her gastroenterologists. She has not seen a rheumatologist in over a year and is considering genetic testing due to a family history of severe gastrointestinal issues, strokes, blood

## 2025-08-12 ENCOUNTER — PATIENT MESSAGE (OUTPATIENT)
Dept: FAMILY MEDICINE CLINIC | Age: 50
End: 2025-08-12

## 2025-08-12 DIAGNOSIS — F41.9 ANXIETY: ICD-10-CM

## 2025-08-12 RX ORDER — HYDROXYZINE PAMOATE 25 MG/1
25 CAPSULE ORAL 3 TIMES DAILY PRN
Qty: 90 CAPSULE | Refills: 0 | Status: SHIPPED | OUTPATIENT
Start: 2025-08-12

## 2025-08-13 ENCOUNTER — TELEPHONE (OUTPATIENT)
Dept: FAMILY MEDICINE CLINIC | Age: 50
End: 2025-08-13

## 2025-08-13 DIAGNOSIS — F41.9 ANXIETY: ICD-10-CM

## 2025-08-13 DIAGNOSIS — F51.01 PRIMARY INSOMNIA: ICD-10-CM

## 2025-08-13 RX ORDER — QUETIAPINE FUMARATE 50 MG/1
50 TABLET, EXTENDED RELEASE ORAL NIGHTLY
Qty: 30 TABLET | Refills: 1 | Status: SHIPPED | OUTPATIENT
Start: 2025-08-13